# Patient Record
Sex: MALE | Race: OTHER | Employment: FULL TIME | URBAN - METROPOLITAN AREA
[De-identification: names, ages, dates, MRNs, and addresses within clinical notes are randomized per-mention and may not be internally consistent; named-entity substitution may affect disease eponyms.]

---

## 2017-04-08 ENCOUNTER — LAB ENCOUNTER (OUTPATIENT)
Dept: LAB | Age: 37
End: 2017-04-08
Attending: FAMILY MEDICINE
Payer: COMMERCIAL

## 2017-04-08 ENCOUNTER — OFFICE VISIT (OUTPATIENT)
Dept: FAMILY MEDICINE CLINIC | Facility: CLINIC | Age: 37
End: 2017-04-08

## 2017-04-08 VITALS
BODY MASS INDEX: 34.72 KG/M2 | DIASTOLIC BLOOD PRESSURE: 80 MMHG | TEMPERATURE: 99 F | WEIGHT: 248 LBS | HEART RATE: 91 BPM | SYSTOLIC BLOOD PRESSURE: 124 MMHG | HEIGHT: 71 IN

## 2017-04-08 DIAGNOSIS — E11.65 UNCONTROLLED TYPE 2 DIABETES MELLITUS WITH HYPERGLYCEMIA, WITHOUT LONG-TERM CURRENT USE OF INSULIN (HCC): ICD-10-CM

## 2017-04-08 DIAGNOSIS — Z00.00 ADULT GENERAL MEDICAL EXAM: Primary | ICD-10-CM

## 2017-04-08 DIAGNOSIS — Z00.00 ADULT GENERAL MEDICAL EXAM: ICD-10-CM

## 2017-04-08 PROCEDURE — 99385 PREV VISIT NEW AGE 18-39: CPT | Performed by: FAMILY MEDICINE

## 2017-04-08 PROCEDURE — 85025 COMPLETE CBC W/AUTO DIFF WBC: CPT

## 2017-04-08 PROCEDURE — 82570 ASSAY OF URINE CREATININE: CPT

## 2017-04-08 PROCEDURE — 82043 UR ALBUMIN QUANTITATIVE: CPT

## 2017-04-08 PROCEDURE — 80061 LIPID PANEL: CPT

## 2017-04-08 PROCEDURE — 80053 COMPREHEN METABOLIC PANEL: CPT

## 2017-04-08 PROCEDURE — 84443 ASSAY THYROID STIM HORMONE: CPT

## 2017-04-08 PROCEDURE — 83036 HEMOGLOBIN GLYCOSYLATED A1C: CPT

## 2017-04-08 PROCEDURE — 36415 COLL VENOUS BLD VENIPUNCTURE: CPT

## 2017-04-08 RX ORDER — PIOGLITAZONEHYDROCHLORIDE 30 MG/1
30 TABLET ORAL DAILY
COMMUNITY
End: 2017-04-09

## 2017-04-08 RX ORDER — CETIRIZINE HYDROCHLORIDE 10 MG/1
10 TABLET ORAL DAILY
COMMUNITY
End: 2018-05-17

## 2017-04-08 NOTE — PROGRESS NOTES
Patient ID: Marycruz Serrano is a 31year old male. HPI  Patient presents with:  Routine Physical      This is his first time here. He is . He does not smoke.   He works as a .  He states there is no family history of diabetes and hyperte Exam  Physical Exam   Constitutional: He appears well-developed and well-nourished. No distress. HENT:   Head: Normocephalic.    Right Ear: Tympanic membrane and ear canal normal.   Left Ear: Tympanic membrane and ear canal normal.   Mouth/Throat: Orophar symptoms persist.  Take medicine (if given) as prescribed. Approach to treatment discussed and patient/family member understands and agrees to plan. Tae Munguia DO  4/8/2017      .

## 2017-04-11 ENCOUNTER — TELEPHONE (OUTPATIENT)
Dept: FAMILY MEDICINE CLINIC | Facility: CLINIC | Age: 37
End: 2017-04-11

## 2017-04-11 NOTE — TELEPHONE ENCOUNTER
----- Message from Gus Sanford DO sent at 4/9/2017  4:57 PM CDT -----  Your diabetes is terribly uncontrolled. Stay on the pioglitazone but I will increase this to 45 mg daily and we will start her on metformin twice daily.   You need to be on a cholest

## 2017-04-11 NOTE — TELEPHONE ENCOUNTER
Spoke with patient (identified name and ), results reviewed and agrees with plan.  Appointment made 17 4:40 pm

## 2017-05-09 ENCOUNTER — OFFICE VISIT (OUTPATIENT)
Dept: FAMILY MEDICINE CLINIC | Facility: CLINIC | Age: 37
End: 2017-05-09

## 2017-05-09 VITALS
SYSTOLIC BLOOD PRESSURE: 115 MMHG | DIASTOLIC BLOOD PRESSURE: 74 MMHG | HEART RATE: 75 BPM | BODY MASS INDEX: 36.26 KG/M2 | WEIGHT: 259 LBS | HEIGHT: 71 IN | TEMPERATURE: 98 F

## 2017-05-09 DIAGNOSIS — L30.9 DERMATITIS: ICD-10-CM

## 2017-05-09 DIAGNOSIS — Z23 NEED FOR VACCINATION: ICD-10-CM

## 2017-05-09 DIAGNOSIS — E11.65 UNCONTROLLED TYPE 2 DIABETES MELLITUS WITH HYPERGLYCEMIA, WITHOUT LONG-TERM CURRENT USE OF INSULIN (HCC): Primary | ICD-10-CM

## 2017-05-09 DIAGNOSIS — L73.0 ACNE KELOIDALIS NUCHAE: ICD-10-CM

## 2017-05-09 PROCEDURE — 90715 TDAP VACCINE 7 YRS/> IM: CPT | Performed by: FAMILY MEDICINE

## 2017-05-09 PROCEDURE — 99212 OFFICE O/P EST SF 10 MIN: CPT | Performed by: FAMILY MEDICINE

## 2017-05-09 PROCEDURE — 90471 IMMUNIZATION ADMIN: CPT | Performed by: FAMILY MEDICINE

## 2017-05-09 PROCEDURE — 99214 OFFICE O/P EST MOD 30 MIN: CPT | Performed by: FAMILY MEDICINE

## 2017-05-09 NOTE — PROGRESS NOTES
Patient ID: Marycruz Serrano is a 31year old male. HPI  Patient presents with:  Lab Results    He does have diabetes. He did start the medicines. He is tolerating everything fine. He would like to see a diabetic educator.   He does not remember his las Date   A1C 10.4* 04/08/2017         Lab Results  Component Value Date   MALBP 6.2* 04/08/2017   CREUR 258.1 04/08/2017         Lab Results  Component Value Date   CHOLEST 155 04/08/2017   TRIG 172* 04/08/2017   HDL 35 04/08/2017   LDL 86 04/08/2017   NONHD place, and time. Patient appears well-developed and well-nourished. No distress. HENT:   Head: Normocephalic and atraumatic. Neck: Normal range of motion. Neck supple. No thyromegaly present. Lymphadenopathy:     Has  no cervical adenopathy.    Cardio

## 2017-05-24 ENCOUNTER — OFFICE VISIT (OUTPATIENT)
Dept: PODIATRY CLINIC | Facility: CLINIC | Age: 37
End: 2017-05-24

## 2017-05-24 DIAGNOSIS — E11.9 TYPE 2 DIABETES MELLITUS WITHOUT COMPLICATION, WITHOUT LONG-TERM CURRENT USE OF INSULIN (HCC): Primary | ICD-10-CM

## 2017-05-24 PROCEDURE — 99243 OFF/OP CNSLTJ NEW/EST LOW 30: CPT | Performed by: PODIATRIST

## 2017-05-24 PROCEDURE — 99212 OFFICE O/P EST SF 10 MIN: CPT | Performed by: PODIATRIST

## 2017-05-24 NOTE — PROGRESS NOTES
HPI:    Patient ID: Marycruz Serrano is a 31year old male. HPI  This 61-year-old male presents as a new patient to me on consult from [address]. Patient states that he is here for a diabetic foot evaluation.   He was recently diagnosed with diabetes and i 1 year but he was encouraged to call immediately with any foot related concerns.   Patient indicates an understanding of my instructions         ASSESSMENT/PLAN:   Type 2 diabetes mellitus without complication, without long-term current use of insulin (hcc)

## 2017-06-03 ENCOUNTER — OFFICE VISIT (OUTPATIENT)
Dept: DERMATOLOGY CLINIC | Facility: CLINIC | Age: 37
End: 2017-06-03

## 2017-06-03 ENCOUNTER — APPOINTMENT (OUTPATIENT)
Dept: LAB | Age: 37
End: 2017-06-03
Attending: FAMILY MEDICINE
Payer: COMMERCIAL

## 2017-06-03 DIAGNOSIS — L30.9 DERMATITIS: Primary | ICD-10-CM

## 2017-06-03 DIAGNOSIS — E11.65 UNCONTROLLED TYPE 2 DIABETES MELLITUS WITH HYPERGLYCEMIA, WITHOUT LONG-TERM CURRENT USE OF INSULIN (HCC): ICD-10-CM

## 2017-06-03 PROCEDURE — 36415 COLL VENOUS BLD VENIPUNCTURE: CPT

## 2017-06-03 PROCEDURE — 84460 ALANINE AMINO (ALT) (SGPT): CPT

## 2017-06-03 PROCEDURE — 99202 OFFICE O/P NEW SF 15 MIN: CPT | Performed by: DERMATOLOGY

## 2017-06-03 PROCEDURE — 80048 BASIC METABOLIC PNL TOTAL CA: CPT

## 2017-06-03 PROCEDURE — 99212 OFFICE O/P EST SF 10 MIN: CPT | Performed by: DERMATOLOGY

## 2017-06-03 PROCEDURE — 83036 HEMOGLOBIN GLYCOSYLATED A1C: CPT

## 2017-06-03 PROCEDURE — 80061 LIPID PANEL: CPT

## 2017-06-03 PROCEDURE — 84450 TRANSFERASE (AST) (SGOT): CPT

## 2017-06-03 RX ORDER — MONTELUKAST SODIUM 10 MG/1
10 TABLET ORAL NIGHTLY
Qty: 30 TABLET | Refills: 11 | Status: SHIPPED | OUTPATIENT
Start: 2017-06-03 | End: 2018-05-17

## 2017-06-17 NOTE — PROGRESS NOTES
Quick Note:    Letter mailed to pt with MD recommendation.     8/24/2017 4:15 PM Mitch Bradshaw MD [address] [facility]    ______

## 2017-06-19 NOTE — PROGRESS NOTES
Ally Poole is a 31year old male. Patient presents with:  Rash: New patient with rash to gonzález forearms, abd,  back x 4 months. Waxes and wanes. Pruritic at times. Tried rx cream without improvement. Unknown name of med.              Review of patient 2.5 MG Oral Tab Take 1 tablet (2.5 mg total) by mouth daily. For kidney protection and/or blood pressure Disp: 30 tablet Rfl: 3   cetirizine 10 MG Oral Tab Take 10 mg by mouth daily.  Disp:  Rfl:      Allergies:   No Known Allergies    Past Medical History including conjunctival mucosa, eyelids, oral mucosa, external ears, back, chest, abdomen, bilateral arms, bilateral legs, palms.         Remarkable for multiple erythematous scaling eczematous patches over arms abd, back    Exam otherwise significant for  3

## 2017-09-20 RX ORDER — PIOGLITAZONEHYDROCHLORIDE 45 MG/1
TABLET ORAL
Qty: 90 TABLET | Refills: 0 | Status: SHIPPED | OUTPATIENT
Start: 2017-09-20 | End: 2017-11-09

## 2017-09-20 RX ORDER — LISINOPRIL 2.5 MG/1
TABLET ORAL
Qty: 90 TABLET | Refills: 0 | Status: SHIPPED | OUTPATIENT
Start: 2017-09-20 | End: 2017-11-09

## 2017-09-20 NOTE — TELEPHONE ENCOUNTER
Please advise regarding pended refill requests as unable to refill per protocol d/t last A1c.     Lisinopril refilled per protocol     Diabetes Medications  Protocol Criteria:  · Appointment scheduled in the past 6 months or the next 3 months  · A1C < 7.5 i without long-term current use of insulin [facility])    [facility], [facility], [address], [address], [place], DO    Office Visit    5 months ago Adult general medical exam    [address], [place], [address] [place] [address]    Office Visit        Future

## 2017-09-21 NOTE — TELEPHONE ENCOUNTER
GUILLERMO- Please call and schedule an appointment for patient with Dr. Fredy Osuna. See message below.

## 2017-10-05 ENCOUNTER — OFFICE VISIT (OUTPATIENT)
Dept: FAMILY MEDICINE CLINIC | Facility: CLINIC | Age: 37
End: 2017-10-05

## 2017-10-05 VITALS
SYSTOLIC BLOOD PRESSURE: 123 MMHG | WEIGHT: 269.19 LBS | TEMPERATURE: 99 F | BODY MASS INDEX: 37.69 KG/M2 | HEIGHT: 71 IN | HEART RATE: 87 BPM | RESPIRATION RATE: 14 BRPM | DIASTOLIC BLOOD PRESSURE: 81 MMHG

## 2017-10-05 DIAGNOSIS — E55.9 VITAMIN D DEFICIENCY: ICD-10-CM

## 2017-10-05 DIAGNOSIS — D69.6 THROMBOCYTOPENIA (HCC): ICD-10-CM

## 2017-10-05 DIAGNOSIS — E11.65 UNCONTROLLED TYPE 2 DIABETES MELLITUS WITH HYPERGLYCEMIA, WITHOUT LONG-TERM CURRENT USE OF INSULIN (HCC): Primary | ICD-10-CM

## 2017-10-05 DIAGNOSIS — Z23 NEED FOR VACCINATION: ICD-10-CM

## 2017-10-05 PROCEDURE — 99212 OFFICE O/P EST SF 10 MIN: CPT | Performed by: FAMILY MEDICINE

## 2017-10-05 PROCEDURE — 90732 PPSV23 VACC 2 YRS+ SUBQ/IM: CPT | Performed by: FAMILY MEDICINE

## 2017-10-05 PROCEDURE — 90472 IMMUNIZATION ADMIN EACH ADD: CPT | Performed by: FAMILY MEDICINE

## 2017-10-05 PROCEDURE — 99214 OFFICE O/P EST MOD 30 MIN: CPT | Performed by: FAMILY MEDICINE

## 2017-10-05 PROCEDURE — 90471 IMMUNIZATION ADMIN: CPT | Performed by: FAMILY MEDICINE

## 2017-10-05 PROCEDURE — 90686 IIV4 VACC NO PRSV 0.5 ML IM: CPT | Performed by: FAMILY MEDICINE

## 2017-10-05 NOTE — PROGRESS NOTES
Patient ID: Dontrell Loomis is a 30year old male. HPI  Patient presents with:  Diabetes    He states he feels very good. He did see the podiatrist already. He has an appointment with the ophthalmologist in the near future.     He works as a  a [employer], [employer], [employer], [employer], [employer], [place], [place], [employer]    Lab Results  Component Value Date   A1C 7.6 (H) 06/03/2017   A1C 10.4 (H) 04/08/2017         Lab Results  Component Value Date   MALBP 6.2 (H) 04/08/2017   CREUR 2 temperature 98.9 °F (37.2 °C), temperature source Oral, resp. rate 14, height 5' 11\" (1.803 m), weight 269 lb 3.2 oz (122.1 kg). Physical Exam   Constitutional: Patient is oriented to person, place, and time.  Patient appears well-developed and well-nida Gene Client, DO  10/5/2017

## 2017-10-28 ENCOUNTER — LAB ENCOUNTER (OUTPATIENT)
Dept: LAB | Age: 37
End: 2017-10-28
Attending: FAMILY MEDICINE
Payer: COMMERCIAL

## 2017-10-28 DIAGNOSIS — E55.9 VITAMIN D DEFICIENCY: ICD-10-CM

## 2017-10-28 DIAGNOSIS — D69.6 THROMBOCYTOPENIA (HCC): ICD-10-CM

## 2017-10-28 DIAGNOSIS — E11.65 UNCONTROLLED TYPE 2 DIABETES MELLITUS WITH HYPERGLYCEMIA, WITHOUT LONG-TERM CURRENT USE OF INSULIN (HCC): ICD-10-CM

## 2017-10-28 PROCEDURE — 80048 BASIC METABOLIC PNL TOTAL CA: CPT

## 2017-10-28 PROCEDURE — 83036 HEMOGLOBIN GLYCOSYLATED A1C: CPT

## 2017-10-28 PROCEDURE — 85025 COMPLETE CBC W/AUTO DIFF WBC: CPT

## 2017-10-28 PROCEDURE — 82306 VITAMIN D 25 HYDROXY: CPT

## 2017-10-28 PROCEDURE — 36415 COLL VENOUS BLD VENIPUNCTURE: CPT

## 2017-11-10 RX ORDER — LISINOPRIL 2.5 MG/1
TABLET ORAL
Qty: 90 TABLET | Refills: 0 | Status: SHIPPED | OUTPATIENT
Start: 2017-11-10 | End: 2018-02-24

## 2017-11-10 RX ORDER — PIOGLITAZONEHYDROCHLORIDE 45 MG/1
TABLET ORAL
Qty: 90 TABLET | Refills: 0 | Status: SHIPPED | OUTPATIENT
Start: 2017-11-10 | End: 2018-02-24

## 2017-11-10 NOTE — TELEPHONE ENCOUNTER
Refilled per protocol      Diabetes Medications  Protocol Criteria:  · Appointment scheduled in the past 6 months or the next 3 months  · A1C < 7.5 in the past 6 months  · Creatinine in the past 12 months  · Creatinine result < 1.5   Recent Outpatient Visi 5 months ago Type 2 diabetes mellitus without complication, without long-term current use of insulin [facility])    [facility], [address] [place], [place]    Office Visit    6 months ago Uncontrolled type 2 diabetes

## 2017-12-21 ENCOUNTER — TELEPHONE (OUTPATIENT)
Dept: OPHTHALMOLOGY | Facility: CLINIC | Age: 37
End: 2017-12-21

## 2017-12-21 NOTE — TELEPHONE ENCOUNTER
Patient did not show up for appointment. Left message for patient to call back and reschedule. Missed appointment letter sent out.

## 2018-01-30 ENCOUNTER — TELEPHONE (OUTPATIENT)
Dept: FAMILY MEDICINE CLINIC | Facility: CLINIC | Age: 38
End: 2018-01-30

## 2018-01-30 DIAGNOSIS — E11.9 CONTROLLED TYPE 2 DIABETES MELLITUS WITHOUT COMPLICATION, WITHOUT LONG-TERM CURRENT USE OF INSULIN (HCC): Primary | ICD-10-CM

## 2018-02-03 ENCOUNTER — APPOINTMENT (OUTPATIENT)
Dept: LAB | Age: 38
End: 2018-02-03
Attending: FAMILY MEDICINE
Payer: COMMERCIAL

## 2018-02-03 DIAGNOSIS — E11.9 CONTROLLED TYPE 2 DIABETES MELLITUS WITHOUT COMPLICATION, WITHOUT LONG-TERM CURRENT USE OF INSULIN (HCC): ICD-10-CM

## 2018-02-03 LAB
ALT SERPL-CCNC: 37 U/L (ref 17–63)
ANION GAP SERPL CALC-SCNC: 6 MMOL/L (ref 0–18)
AST SERPL-CCNC: 33 U/L (ref 15–41)
BUN SERPL-MCNC: 10 MG/DL (ref 8–20)
BUN/CREAT SERPL: 15.4 (ref 10–20)
CALCIUM SERPL-MCNC: 9.2 MG/DL (ref 8.5–10.5)
CHLORIDE SERPL-SCNC: 105 MMOL/L (ref 95–110)
CHOLEST SERPL-MCNC: 134 MG/DL (ref 110–200)
CO2 SERPL-SCNC: 26 MMOL/L (ref 22–32)
CREAT SERPL-MCNC: 0.65 MG/DL (ref 0.5–1.5)
CREAT UR-MCNC: 132.9 MG/DL
GLUCOSE SERPL-MCNC: 100 MG/DL (ref 70–99)
HDLC SERPL-MCNC: 38 MG/DL
LDLC SERPL CALC-MCNC: 80 MG/DL (ref 0–99)
MICROALBUMIN UR-MCNC: 0.8 MG/DL (ref 0–1.8)
MICROALBUMIN/CREAT UR: 6 MG/G{CREAT} (ref 0–20)
NONHDLC SERPL-MCNC: 96 MG/DL
OSMOLALITY UR CALC.SUM OF ELEC: 283 MOSM/KG (ref 275–295)
POTASSIUM SERPL-SCNC: 3.9 MMOL/L (ref 3.3–5.1)
SODIUM SERPL-SCNC: 137 MMOL/L (ref 136–144)
TRIGL SERPL-MCNC: 78 MG/DL (ref 1–149)

## 2018-02-03 PROCEDURE — 80061 LIPID PANEL: CPT

## 2018-02-03 PROCEDURE — 84450 TRANSFERASE (AST) (SGOT): CPT

## 2018-02-03 PROCEDURE — 82570 ASSAY OF URINE CREATININE: CPT

## 2018-02-03 PROCEDURE — 82043 UR ALBUMIN QUANTITATIVE: CPT

## 2018-02-03 PROCEDURE — 36415 COLL VENOUS BLD VENIPUNCTURE: CPT

## 2018-02-03 PROCEDURE — 80048 BASIC METABOLIC PNL TOTAL CA: CPT

## 2018-02-03 PROCEDURE — 84460 ALANINE AMINO (ALT) (SGPT): CPT

## 2018-02-03 PROCEDURE — 83036 HEMOGLOBIN GLYCOSYLATED A1C: CPT

## 2018-02-04 LAB — HBA1C MFR BLD: 7.5 % (ref 4–6)

## 2018-02-22 ENCOUNTER — TELEPHONE (OUTPATIENT)
Dept: OTHER | Age: 38
End: 2018-02-22

## 2018-02-22 NOTE — TELEPHONE ENCOUNTER
Patient was left a detail message on personal voicemail.   Keep appt 2/24/18 with DR CROOKS.     If has any questions, patient to call back can transfer to [phone]    Notes Recorded by Mitch Knight DO on 2/20/2018 at 7:07 PM CST  Please have him see me as he w

## 2018-02-24 ENCOUNTER — APPOINTMENT (OUTPATIENT)
Dept: LAB | Age: 38
End: 2018-02-24
Attending: FAMILY MEDICINE
Payer: COMMERCIAL

## 2018-02-24 ENCOUNTER — OFFICE VISIT (OUTPATIENT)
Dept: FAMILY MEDICINE CLINIC | Facility: CLINIC | Age: 38
End: 2018-02-24

## 2018-02-24 VITALS
TEMPERATURE: 98 F | WEIGHT: 275 LBS | HEART RATE: 82 BPM | HEIGHT: 71 IN | DIASTOLIC BLOOD PRESSURE: 69 MMHG | BODY MASS INDEX: 38.5 KG/M2 | SYSTOLIC BLOOD PRESSURE: 117 MMHG

## 2018-02-24 DIAGNOSIS — E55.9 VITAMIN D DEFICIENCY: ICD-10-CM

## 2018-02-24 DIAGNOSIS — D69.6 THROMBOCYTOPENIA (HCC): ICD-10-CM

## 2018-02-24 DIAGNOSIS — E78.2 MIXED HYPERLIPIDEMIA: ICD-10-CM

## 2018-02-24 DIAGNOSIS — E11.65 UNCONTROLLED TYPE 2 DIABETES MELLITUS WITH HYPERGLYCEMIA, WITHOUT LONG-TERM CURRENT USE OF INSULIN (HCC): ICD-10-CM

## 2018-02-24 DIAGNOSIS — E11.65 UNCONTROLLED TYPE 2 DIABETES MELLITUS WITH HYPERGLYCEMIA, WITHOUT LONG-TERM CURRENT USE OF INSULIN (HCC): Primary | ICD-10-CM

## 2018-02-24 PROCEDURE — 99214 OFFICE O/P EST MOD 30 MIN: CPT | Performed by: FAMILY MEDICINE

## 2018-02-24 PROCEDURE — 99212 OFFICE O/P EST SF 10 MIN: CPT | Performed by: FAMILY MEDICINE

## 2018-02-24 PROCEDURE — 93005 ELECTROCARDIOGRAM TRACING: CPT

## 2018-02-24 PROCEDURE — 93010 ELECTROCARDIOGRAM REPORT: CPT | Performed by: FAMILY MEDICINE

## 2018-02-24 RX ORDER — ATORVASTATIN CALCIUM 20 MG/1
20 TABLET, FILM COATED ORAL NIGHTLY
Qty: 90 TABLET | Refills: 1 | Status: SHIPPED | OUTPATIENT
Start: 2018-02-24 | End: 2018-05-17

## 2018-02-24 RX ORDER — LISINOPRIL 2.5 MG/1
TABLET ORAL
Qty: 90 TABLET | Refills: 0 | Status: SHIPPED | OUTPATIENT
Start: 2018-02-24 | End: 2018-05-17

## 2018-02-24 RX ORDER — PIOGLITAZONEHYDROCHLORIDE 45 MG/1
TABLET ORAL
Qty: 90 TABLET | Refills: 1 | Status: SHIPPED | OUTPATIENT
Start: 2018-02-24 | End: 2018-05-17

## 2018-02-24 NOTE — PROGRESS NOTES
Patient ID: Jaye Ho is a 30year old male.     HPI  Patient presents with:  Results: result and meds per pt., pt would like rx for 90 a day   Medication Problem: stomach pain from meds per pt    Patient was left a detail message on personal voicemail 02/03/2018   GFRNAA >60 02/03/2018   GFRAA >60 02/03/2018   CA 9.2 02/03/2018   OSMOCALC 283 02/03/2018   ALKPHO 93 04/08/2017   AST 33 02/03/2018   ALT 37 02/03/2018   BILT 0.8 04/08/2017   TP 7.8 04/08/2017   ALB 4.2 04/08/2017   GLOBULIN 3.6 04/08/2017 123/81  05/09/17 : 115/74  04/08/17 : 124/80        Review of Systems      Past Medical History:   Diagnosis Date   • Borderline diabetes    • Diabetes ([facility] [address]        History reviewed. No pertinent surgical history.        Current Outpatient Prescriptions:  M insulin (HCC)  -     MetFORMIN HCl 1000 MG Oral Tab; TAKE ONE TABLET BY MOUTH TWICE DAILY WITH MEALS FOR DIABETES  -     Pioglitazone HCl 45 MG Oral Tab; TAKE ONE TABLET BY MOUTH ONCE DAILY for diabetes  -     lisinopril 2.5 MG Oral Tab; TAKE ONE TABLET BY

## 2018-04-28 ENCOUNTER — APPOINTMENT (OUTPATIENT)
Dept: LAB | Age: 38
End: 2018-04-28
Attending: FAMILY MEDICINE
Payer: COMMERCIAL

## 2018-04-28 DIAGNOSIS — D69.6 THROMBOCYTOPENIA (HCC): ICD-10-CM

## 2018-04-28 DIAGNOSIS — E78.2 MIXED HYPERLIPIDEMIA: ICD-10-CM

## 2018-04-28 DIAGNOSIS — E55.9 VITAMIN D DEFICIENCY: ICD-10-CM

## 2018-04-28 DIAGNOSIS — E11.65 UNCONTROLLED TYPE 2 DIABETES MELLITUS WITH HYPERGLYCEMIA, WITHOUT LONG-TERM CURRENT USE OF INSULIN (HCC): ICD-10-CM

## 2018-04-28 PROCEDURE — 82306 VITAMIN D 25 HYDROXY: CPT

## 2018-04-28 PROCEDURE — 36415 COLL VENOUS BLD VENIPUNCTURE: CPT

## 2018-04-28 PROCEDURE — 80061 LIPID PANEL: CPT

## 2018-04-28 PROCEDURE — 80053 COMPREHEN METABOLIC PANEL: CPT

## 2018-04-28 PROCEDURE — 83036 HEMOGLOBIN GLYCOSYLATED A1C: CPT

## 2018-05-17 ENCOUNTER — OFFICE VISIT (OUTPATIENT)
Dept: FAMILY MEDICINE CLINIC | Facility: CLINIC | Age: 38
End: 2018-05-17

## 2018-05-17 ENCOUNTER — APPOINTMENT (OUTPATIENT)
Dept: LAB | Age: 38
End: 2018-05-17
Attending: FAMILY MEDICINE
Payer: COMMERCIAL

## 2018-05-17 VITALS
TEMPERATURE: 98 F | BODY MASS INDEX: 38 KG/M2 | WEIGHT: 275.81 LBS | SYSTOLIC BLOOD PRESSURE: 117 MMHG | DIASTOLIC BLOOD PRESSURE: 77 MMHG | HEART RATE: 90 BPM

## 2018-05-17 DIAGNOSIS — R14.0 ABDOMINAL BLOATING: ICD-10-CM

## 2018-05-17 DIAGNOSIS — R14.0 ABDOMINAL BLOATING: Primary | ICD-10-CM

## 2018-05-17 DIAGNOSIS — E78.2 MIXED HYPERLIPIDEMIA: ICD-10-CM

## 2018-05-17 DIAGNOSIS — R10.33 ABDOMINAL PAIN, PERIUMBILICAL: ICD-10-CM

## 2018-05-17 DIAGNOSIS — E11.65 UNCONTROLLED TYPE 2 DIABETES MELLITUS WITH HYPERGLYCEMIA, WITHOUT LONG-TERM CURRENT USE OF INSULIN (HCC): ICD-10-CM

## 2018-05-17 DIAGNOSIS — D69.6 THROMBOCYTOPENIA (HCC): ICD-10-CM

## 2018-05-17 PROCEDURE — 99212 OFFICE O/P EST SF 10 MIN: CPT | Performed by: FAMILY MEDICINE

## 2018-05-17 PROCEDURE — 99214 OFFICE O/P EST MOD 30 MIN: CPT | Performed by: FAMILY MEDICINE

## 2018-05-17 PROCEDURE — 83013 H PYLORI (C-13) BREATH: CPT

## 2018-05-17 RX ORDER — LISINOPRIL 2.5 MG/1
TABLET ORAL
Qty: 90 TABLET | Refills: 1 | Status: SHIPPED | OUTPATIENT
Start: 2018-05-17 | End: 2018-11-10

## 2018-05-17 RX ORDER — ATORVASTATIN CALCIUM 20 MG/1
20 TABLET, FILM COATED ORAL NIGHTLY
Qty: 90 TABLET | Refills: 1 | Status: SHIPPED | OUTPATIENT
Start: 2018-05-17 | End: 2018-09-14

## 2018-05-17 RX ORDER — PIOGLITAZONEHYDROCHLORIDE 45 MG/1
TABLET ORAL
Qty: 90 TABLET | Refills: 1 | Status: SHIPPED | OUTPATIENT
Start: 2018-05-17 | End: 2018-11-10

## 2018-05-17 NOTE — PROGRESS NOTES
Patient ID: Monse Cloud is a 30year old male. HPI  Patient presents with:  Lab Results    I saw him in February 2018. He states when he takes Lipitor at nighttime he will wake up with a sore stomach periumbilically.   It does not happen all the time BUNCREA 16.7 04/28/2018   ANIONGAP 9 04/28/2018   GFRAA >60 04/28/2018   GFRNAA >60 04/28/2018   CA 9.5 04/28/2018    04/28/2018   K 3.9 04/28/2018    04/28/2018   CO2 24 04/28/2018   OSMOCALC 284 04/28/2018       No results found for: Ernestine Ratel Borderline diabetes    • Diabetes ([facility] [address]        History reviewed. No pertinent surgical history. Current Outpatient Prescriptions:  atorvastatin (LIPITOR) 20 MG Oral Tab Take 1 tablet (20 mg total) by mouth nightly. For cholesterol.  Disp: 90 tablet Rf this visit:    Abdominal bloating  -     HELICOBACTER PYLORI BREATH TEST, ADULT (>17); Future  I will do an H. pylori test today. Await treatment until he get this test back.   Abdominal pain, periumbilical  -     HELICOBACTER PYLORI BREATH TEST, ADULT (>1

## 2018-10-23 ENCOUNTER — APPOINTMENT (OUTPATIENT)
Dept: GENERAL RADIOLOGY | Facility: HOSPITAL | Age: 38
End: 2018-10-23
Attending: EMERGENCY MEDICINE
Payer: COMMERCIAL

## 2018-10-23 ENCOUNTER — HOSPITAL ENCOUNTER (EMERGENCY)
Facility: HOSPITAL | Age: 38
Discharge: HOME | End: 2018-10-23
Attending: EMERGENCY MEDICINE
Payer: COMMERCIAL

## 2018-10-23 VITALS
BODY MASS INDEX: 40.6 KG/M2 | OXYGEN SATURATION: 96 % | HEART RATE: 74 BPM | TEMPERATURE: 98 F | SYSTOLIC BLOOD PRESSURE: 111 MMHG | WEIGHT: 290 LBS | RESPIRATION RATE: 17 BRPM | DIASTOLIC BLOOD PRESSURE: 63 MMHG | HEIGHT: 71 IN

## 2018-10-23 DIAGNOSIS — R07.89 CHEST PAIN, ATYPICAL: Primary | ICD-10-CM

## 2018-10-23 PROCEDURE — 85025 COMPLETE CBC W/AUTO DIFF WBC: CPT | Performed by: EMERGENCY MEDICINE

## 2018-10-23 PROCEDURE — 93005 ELECTROCARDIOGRAM TRACING: CPT

## 2018-10-23 PROCEDURE — 84484 ASSAY OF TROPONIN QUANT: CPT | Performed by: EMERGENCY MEDICINE

## 2018-10-23 PROCEDURE — 93010 ELECTROCARDIOGRAM REPORT: CPT | Performed by: EMERGENCY MEDICINE

## 2018-10-23 PROCEDURE — 99285 EMERGENCY DEPT VISIT HI MDM: CPT

## 2018-10-23 PROCEDURE — 96374 THER/PROPH/DIAG INJ IV PUSH: CPT

## 2018-10-23 PROCEDURE — 80048 BASIC METABOLIC PNL TOTAL CA: CPT | Performed by: EMERGENCY MEDICINE

## 2018-10-23 PROCEDURE — 71046 X-RAY EXAM CHEST 2 VIEWS: CPT | Performed by: EMERGENCY MEDICINE

## 2018-10-23 RX ORDER — KETOROLAC TROMETHAMINE 30 MG/ML
30 INJECTION, SOLUTION INTRAMUSCULAR; INTRAVENOUS ONCE
Status: COMPLETED | OUTPATIENT
Start: 2018-10-23 | End: 2018-10-23

## 2018-10-24 NOTE — ED PROVIDER NOTES
Patient Seen in: [facility] AND [facility] Emergency Department    History   Patient presents with:  Chest Pain Angina (cardiovascular)    Stated Complaint:     HPI    51-year-old male who does not smoke or use drugs with history of diabetes who presents with so respiratory distress. Clear and equal BS b/l. Abdominal: Soft. There is no tenderness. There is no guarding. Musculoskeletal: Normal range of motion. No edema or tenderness. Neurological: Alert and oriented to person, place, and time.  No focal deficit adenopathy. LUNGS/PLEURA: Normal.  No significant pulmonary parenchymal abnormalities. No effusion or pleural thickening. BONES: No fracture or visible bony lesion. OTHER: Negative.        CONCLUSION:   Negative for radiographically evident acute intratho

## 2018-10-24 NOTE — ED INITIAL ASSESSMENT (HPI)
Pt presents for eval of left-sided chest tightness that began this morning and has worsened over the day.  Denies sob

## 2018-10-27 ENCOUNTER — LAB ENCOUNTER (OUTPATIENT)
Dept: LAB | Age: 38
End: 2018-10-27
Attending: FAMILY MEDICINE
Payer: COMMERCIAL

## 2018-10-27 DIAGNOSIS — D69.6 THROMBOCYTOPENIA (HCC): ICD-10-CM

## 2018-10-27 DIAGNOSIS — E11.65 UNCONTROLLED TYPE 2 DIABETES MELLITUS WITH HYPERGLYCEMIA, WITHOUT LONG-TERM CURRENT USE OF INSULIN (HCC): ICD-10-CM

## 2018-10-27 PROCEDURE — 80048 BASIC METABOLIC PNL TOTAL CA: CPT

## 2018-10-27 PROCEDURE — 83036 HEMOGLOBIN GLYCOSYLATED A1C: CPT

## 2018-10-27 PROCEDURE — 85025 COMPLETE CBC W/AUTO DIFF WBC: CPT

## 2018-10-27 PROCEDURE — 36415 COLL VENOUS BLD VENIPUNCTURE: CPT

## 2018-11-10 ENCOUNTER — OFFICE VISIT (OUTPATIENT)
Dept: FAMILY MEDICINE CLINIC | Facility: CLINIC | Age: 38
End: 2018-11-10
Payer: COMMERCIAL

## 2018-11-10 VITALS
DIASTOLIC BLOOD PRESSURE: 79 MMHG | SYSTOLIC BLOOD PRESSURE: 117 MMHG | WEIGHT: 278 LBS | HEIGHT: 71 IN | TEMPERATURE: 98 F | RESPIRATION RATE: 16 BRPM | BODY MASS INDEX: 38.92 KG/M2 | HEART RATE: 78 BPM

## 2018-11-10 DIAGNOSIS — E78.2 MIXED HYPERLIPIDEMIA: ICD-10-CM

## 2018-11-10 DIAGNOSIS — R07.89 ATYPICAL CHEST PAIN: ICD-10-CM

## 2018-11-10 DIAGNOSIS — F41.9 ANXIETY: ICD-10-CM

## 2018-11-10 DIAGNOSIS — E11.65 UNCONTROLLED TYPE 2 DIABETES MELLITUS WITH HYPERGLYCEMIA, WITHOUT LONG-TERM CURRENT USE OF INSULIN (HCC): Primary | ICD-10-CM

## 2018-11-10 DIAGNOSIS — Z23 NEED FOR VACCINATION: ICD-10-CM

## 2018-11-10 PROCEDURE — 99212 OFFICE O/P EST SF 10 MIN: CPT | Performed by: FAMILY MEDICINE

## 2018-11-10 PROCEDURE — 99215 OFFICE O/P EST HI 40 MIN: CPT | Performed by: FAMILY MEDICINE

## 2018-11-10 RX ORDER — ESCITALOPRAM OXALATE 10 MG/1
10 TABLET ORAL DAILY
Qty: 90 TABLET | Refills: 0 | Status: SHIPPED | OUTPATIENT
Start: 2018-11-10 | End: 2018-12-24

## 2018-11-10 RX ORDER — ATORVASTATIN CALCIUM 20 MG/1
20 TABLET, FILM COATED ORAL NIGHTLY
Qty: 90 TABLET | Refills: 1 | Status: SHIPPED | OUTPATIENT
Start: 2018-11-10 | End: 2019-06-15

## 2018-11-10 RX ORDER — PIOGLITAZONEHYDROCHLORIDE 45 MG/1
TABLET ORAL
Qty: 90 TABLET | Refills: 1 | Status: SHIPPED | OUTPATIENT
Start: 2018-11-10 | End: 2019-06-15

## 2018-11-10 RX ORDER — LISINOPRIL 2.5 MG/1
TABLET ORAL
Qty: 90 TABLET | Refills: 1 | Status: SHIPPED | OUTPATIENT
Start: 2018-11-10 | End: 2019-06-15

## 2018-11-10 NOTE — PATIENT INSTRUCTIONS
HEALTH TIPS     Exercise, work on your diet, watch your portion sizes. Avoid eating late at night. Make sure to EAT BREAKFAST as people who skip breakfast do not lose weight.   I myself have 3-4 \"Brown 'N Serve\" sausages every morning

## 2018-11-10 NOTE — PROGRESS NOTES
Patient ID: Thiago Gonzalez is a 41year old male. HPI  Patient presents with:  Test Results       His hemoglobin A1c is better at 7.2. He came in for exam today.   His kidney function was normal and his CBC was normal.     He went to the emergency room kg/m²  05/09/17 : 36.12 kg/m²      BP Readings from Last 6 Encounters:  11/10/18 : 117/79  10/23/18 : 111/63  05/17/18 : 117/77  02/24/18 : 117/69  10/05/17 : 123/81  05/09/17 : 115/74        Review of Systems      Past Medical History:   Diagnosis Date otherwise very pleasant. Nursing note and vitals reviewed.          ASSESSMENT/PLAN:     Diagnoses and all orders for this visit:    Uncontrolled type 2 diabetes mellitus with hyperglycemia, without long-term current use of insulin (HCC)  -     MetFORMIN H well.      Referrals (if applicable)  No orders of the defined types were placed in this encounter. Follow up if symptoms persist.  Take medicine (if given) as prescribed.   Approach to treatment discussed and patient/family member understands and ag

## 2018-11-18 NOTE — ED PROVIDER NOTES
Patient Seen in: [facility] AND [facility] Emergency Department    History   No chief complaint on file. Stated Complaint: Panic Attack      HPI    42 yo M with PMH DM, HTN, HL, anxiety presenting for evaluation of one day of anxiety.  Noting increasing work/fam dose of metformin 1000 mg. (For diabetes)   Cholecalciferol (VITAMIN D3) 2000 units Oral Tab,  Take 2,000 Units by mouth daily.        Family History   Problem Relation Age of Onset   • Cancer Mother        Social History    Tobacco Use      Smoking status from 10/23/18 as interpreted by myself           Xr Chest Pa + Lat Chest (cpt=71046)    Result Date: 10/23/2018  PROCEDURE: XR CHEST PA + LAT CHEST (CPT=71020)  COMPARISON: None. INDICATIONS: Left side chest tightness for one day. TECHNIQUE:   Two views. (25 mg total) by mouth every 4 (four) hours as needed for Itching or Anxiety (Use caution while taking this medication and operating macinery or driving - it may make you drowsy. )., Print Script, Disp-20 tablet, RKairos AR0

## 2018-11-18 NOTE — ED NOTES
Pt and pts family member provided with discharge instructions and prescriptions. Verbalized understanding for plan of care at home and follow up. All questions/concerns addressed prior to discharge.    Pt ambulated out of er with steady gait

## 2018-11-18 NOTE — ED NOTES
Pt c/o having high BP at home, and then starting to feel anxious. Pt states that he was here x1wk ago for anxiety and was prescribed a medicaiton, but that the medication is on backorder so he has not been able to take it yet.  Pt states that he has had a l

## 2018-12-24 DIAGNOSIS — F41.9 ANXIETY: ICD-10-CM

## 2018-12-24 RX ORDER — ESCITALOPRAM OXALATE 10 MG/1
TABLET ORAL
Qty: 90 TABLET | Refills: 0 | Status: SHIPPED | OUTPATIENT
Start: 2018-12-24 | End: 2019-08-10

## 2019-06-15 DIAGNOSIS — E11.65 UNCONTROLLED TYPE 2 DIABETES MELLITUS WITH HYPERGLYCEMIA, WITHOUT LONG-TERM CURRENT USE OF INSULIN (HCC): ICD-10-CM

## 2019-06-15 DIAGNOSIS — E78.2 MIXED HYPERLIPIDEMIA: ICD-10-CM

## 2019-06-17 RX ORDER — ATORVASTATIN CALCIUM 20 MG/1
TABLET, FILM COATED ORAL
Qty: 90 TABLET | Refills: 0 | Status: SHIPPED | OUTPATIENT
Start: 2019-06-17 | End: 2019-09-09

## 2019-06-17 RX ORDER — PIOGLITAZONEHYDROCHLORIDE 45 MG/1
TABLET ORAL
Qty: 90 TABLET | Refills: 0 | Status: SHIPPED | OUTPATIENT
Start: 2019-06-17 | End: 2019-09-09

## 2019-06-17 RX ORDER — LISINOPRIL 2.5 MG/1
TABLET ORAL
Qty: 90 TABLET | Refills: 0 | Status: SHIPPED | OUTPATIENT
Start: 2019-06-17 | End: 2019-09-09

## 2019-06-17 NOTE — TELEPHONE ENCOUNTER
Refilled times 1 but needs appointment before the next prescription will be filled. Please call patient and set up an office visit as overdue. Long overdue for diabetic visit. Please make him an appointment .

## 2019-08-10 ENCOUNTER — LAB ENCOUNTER (OUTPATIENT)
Dept: LAB | Age: 39
End: 2019-08-10
Attending: FAMILY MEDICINE
Payer: COMMERCIAL

## 2019-08-10 ENCOUNTER — OFFICE VISIT (OUTPATIENT)
Dept: FAMILY MEDICINE CLINIC | Facility: CLINIC | Age: 39
End: 2019-08-10
Payer: COMMERCIAL

## 2019-08-10 VITALS
HEART RATE: 85 BPM | TEMPERATURE: 99 F | WEIGHT: 267.63 LBS | DIASTOLIC BLOOD PRESSURE: 82 MMHG | BODY MASS INDEX: 37.47 KG/M2 | SYSTOLIC BLOOD PRESSURE: 112 MMHG | HEIGHT: 71 IN

## 2019-08-10 DIAGNOSIS — E11.65 UNCONTROLLED TYPE 2 DIABETES MELLITUS WITH HYPERGLYCEMIA, WITHOUT LONG-TERM CURRENT USE OF INSULIN (HCC): ICD-10-CM

## 2019-08-10 DIAGNOSIS — D69.6 THROMBOCYTOPENIA (HCC): ICD-10-CM

## 2019-08-10 DIAGNOSIS — E78.2 MIXED HYPERLIPIDEMIA: ICD-10-CM

## 2019-08-10 DIAGNOSIS — L50.3 DERMATOGRAPHISM: Primary | ICD-10-CM

## 2019-08-10 DIAGNOSIS — E55.9 VITAMIN D DEFICIENCY: ICD-10-CM

## 2019-08-10 LAB
ALBUMIN SERPL-MCNC: 4.1 G/DL (ref 3.4–5)
ALBUMIN/GLOB SERPL: 1.1 {RATIO} (ref 1–2)
ALP LIVER SERPL-CCNC: 98 U/L (ref 45–117)
ALT SERPL-CCNC: 34 U/L (ref 16–61)
ANION GAP SERPL CALC-SCNC: 5 MMOL/L (ref 0–18)
AST SERPL-CCNC: 24 U/L (ref 15–37)
BASOPHILS # BLD AUTO: 0.02 X10(3) UL (ref 0–0.2)
BASOPHILS NFR BLD AUTO: 0.3 %
BILIRUB SERPL-MCNC: 1 MG/DL (ref 0.1–2)
BUN BLD-MCNC: 11 MG/DL (ref 7–18)
BUN/CREAT SERPL: 13.1 (ref 10–20)
CALCIUM BLD-MCNC: 9.1 MG/DL (ref 8.5–10.1)
CHLORIDE SERPL-SCNC: 104 MMOL/L (ref 98–112)
CHOLEST SMN-MCNC: 99 MG/DL (ref ?–200)
CO2 SERPL-SCNC: 30 MMOL/L (ref 21–32)
CREAT BLD-MCNC: 0.84 MG/DL (ref 0.7–1.3)
CREAT UR-SCNC: 304 MG/DL
DEPRECATED RDW RBC AUTO: 41.5 FL (ref 35.1–46.3)
EOSINOPHIL # BLD AUTO: 0.18 X10(3) UL (ref 0–0.7)
EOSINOPHIL NFR BLD AUTO: 2.6 %
ERYTHROCYTE [DISTWIDTH] IN BLOOD BY AUTOMATED COUNT: 12.1 % (ref 11–15)
EST. AVERAGE GLUCOSE BLD GHB EST-MCNC: 146 MG/DL (ref 68–126)
GLOBULIN PLAS-MCNC: 3.9 G/DL (ref 2.8–4.4)
GLUCOSE BLD-MCNC: 122 MG/DL (ref 70–99)
HBA1C MFR BLD HPLC: 6.7 % (ref ?–5.7)
HCT VFR BLD AUTO: 47 % (ref 39–53)
HDLC SERPL-MCNC: 41 MG/DL (ref 40–59)
HGB BLD-MCNC: 16 G/DL (ref 13–17.5)
IMM GRANULOCYTES # BLD AUTO: 0.03 X10(3) UL (ref 0–1)
IMM GRANULOCYTES NFR BLD: 0.4 %
LDLC SERPL CALC-MCNC: 38 MG/DL (ref ?–100)
LYMPHOCYTES # BLD AUTO: 2.02 X10(3) UL (ref 1–4)
LYMPHOCYTES NFR BLD AUTO: 29.6 %
M PROTEIN MFR SERPL ELPH: 8 G/DL (ref 6.4–8.2)
MCH RBC QN AUTO: 32 PG (ref 26–34)
MCHC RBC AUTO-ENTMCNC: 34 G/DL (ref 31–37)
MCV RBC AUTO: 94 FL (ref 80–100)
MICROALBUMIN UR-MCNC: 21.7 MG/DL
MICROALBUMIN/CREAT 24H UR-RTO: 71.4 UG/MG (ref ?–30)
MONOCYTES # BLD AUTO: 0.55 X10(3) UL (ref 0.1–1)
MONOCYTES NFR BLD AUTO: 8.1 %
NEUTROPHILS # BLD AUTO: 4.03 X10 (3) UL (ref 1.5–7.7)
NEUTROPHILS # BLD AUTO: 4.03 X10(3) UL (ref 1.5–7.7)
NEUTROPHILS NFR BLD AUTO: 59 %
NONHDLC SERPL-MCNC: 58 MG/DL (ref ?–130)
OSMOLALITY SERPL CALC.SUM OF ELEC: 289 MOSM/KG (ref 275–295)
PATIENT FASTING: YES
PATIENT FASTING: YES
PLATELET # BLD AUTO: 168 10(3)UL (ref 150–450)
POTASSIUM SERPL-SCNC: 4.7 MMOL/L (ref 3.5–5.1)
RBC # BLD AUTO: 5 X10(6)UL (ref 4.3–5.7)
SODIUM SERPL-SCNC: 139 MMOL/L (ref 136–145)
TRIGL SERPL-MCNC: 101 MG/DL (ref 30–149)
TSI SER-ACNC: 1.12 MIU/ML (ref 0.36–3.74)
VLDLC SERPL CALC-MCNC: 20 MG/DL (ref 0–30)
WBC # BLD AUTO: 6.8 X10(3) UL (ref 4–11)

## 2019-08-10 PROCEDURE — 80061 LIPID PANEL: CPT

## 2019-08-10 PROCEDURE — 85025 COMPLETE CBC W/AUTO DIFF WBC: CPT

## 2019-08-10 PROCEDURE — 36415 COLL VENOUS BLD VENIPUNCTURE: CPT

## 2019-08-10 PROCEDURE — 83036 HEMOGLOBIN GLYCOSYLATED A1C: CPT

## 2019-08-10 PROCEDURE — 99214 OFFICE O/P EST MOD 30 MIN: CPT | Performed by: FAMILY MEDICINE

## 2019-08-10 PROCEDURE — 99212 OFFICE O/P EST SF 10 MIN: CPT | Performed by: FAMILY MEDICINE

## 2019-08-10 PROCEDURE — 84443 ASSAY THYROID STIM HORMONE: CPT

## 2019-08-10 PROCEDURE — 82570 ASSAY OF URINE CREATININE: CPT

## 2019-08-10 PROCEDURE — 82043 UR ALBUMIN QUANTITATIVE: CPT

## 2019-08-10 PROCEDURE — 80053 COMPREHEN METABOLIC PANEL: CPT

## 2019-08-10 NOTE — PROGRESS NOTES
Patient ID: Marycruz Serrano is a 42year old male. HPI  Patient presents with:    He is here for a rash. He has not been seen in some time for the diabetes but today would like his labs also ordered as he is fasting.     The patient works as a . 117/71  11/10/18 : 117/79  10/23/18 : 111/63  05/17/18 : 117/77  02/24/18 : 117/69        Review of Systems   Constitutional: Negative for chills and fever. HENT: Negative for voice change.     Respiratory: Negative for chest tightness and shortness of br membrane, external ear and ear canal normal.   Left Ear: Tympanic membrane, external ear and ear canal normal.   Nose: No mucosal edema or rhinorrhea. Oropharynx: clear and moist. mucous membranes are normal.    Neck: Normal range of motion. Neck supple. Gummy Multivitamins    Go ahead and take a multivitamin with vitamin D in it. I take a gummy multivitamin called Vitafusion Multivites which has all the daily recommended vitamins in it plus vitamin D.   You can get this from any

## 2019-08-10 NOTE — PATIENT INSTRUCTIONS
Gummy Multivitamins    Go ahead and take a multivitamin with vitamin D in it. I take a gummy multivitamin called Vitafusion Multivites which has all the daily recommended vitamins in it plus vitamin D.   You can

## 2019-09-09 ENCOUNTER — OFFICE VISIT (OUTPATIENT)
Dept: FAMILY MEDICINE CLINIC | Facility: CLINIC | Age: 39
End: 2019-09-09
Payer: COMMERCIAL

## 2019-09-09 VITALS
BODY MASS INDEX: 38.89 KG/M2 | HEIGHT: 71 IN | DIASTOLIC BLOOD PRESSURE: 83 MMHG | WEIGHT: 277.81 LBS | SYSTOLIC BLOOD PRESSURE: 118 MMHG | HEART RATE: 87 BPM | TEMPERATURE: 98 F

## 2019-09-09 DIAGNOSIS — E11.29 CONTROLLED TYPE 2 DIABETES MELLITUS WITH MICROALBUMINURIA, WITHOUT LONG-TERM CURRENT USE OF INSULIN (HCC): ICD-10-CM

## 2019-09-09 DIAGNOSIS — Z23 NEED FOR VACCINATION: ICD-10-CM

## 2019-09-09 DIAGNOSIS — R80.9 CONTROLLED TYPE 2 DIABETES MELLITUS WITH MICROALBUMINURIA, WITHOUT LONG-TERM CURRENT USE OF INSULIN (HCC): ICD-10-CM

## 2019-09-09 DIAGNOSIS — E78.2 MIXED HYPERLIPIDEMIA: ICD-10-CM

## 2019-09-09 DIAGNOSIS — Z00.00 ADULT GENERAL MEDICAL EXAM: Primary | ICD-10-CM

## 2019-09-09 PROCEDURE — 99395 PREV VISIT EST AGE 18-39: CPT | Performed by: FAMILY MEDICINE

## 2019-09-09 PROCEDURE — 90471 IMMUNIZATION ADMIN: CPT | Performed by: FAMILY MEDICINE

## 2019-09-09 PROCEDURE — 90686 IIV4 VACC NO PRSV 0.5 ML IM: CPT | Performed by: FAMILY MEDICINE

## 2019-09-09 RX ORDER — LISINOPRIL 2.5 MG/1
TABLET ORAL
Qty: 90 TABLET | Refills: 0 | Status: SHIPPED | OUTPATIENT
Start: 2019-09-09 | End: 2020-10-08

## 2019-09-09 RX ORDER — PIOGLITAZONEHYDROCHLORIDE 45 MG/1
TABLET ORAL
Qty: 90 TABLET | Refills: 0 | Status: SHIPPED | OUTPATIENT
Start: 2019-09-09 | End: 2020-10-08

## 2019-09-09 RX ORDER — ATORVASTATIN CALCIUM 20 MG/1
TABLET, FILM COATED ORAL
Qty: 90 TABLET | Refills: 0 | Status: SHIPPED | OUTPATIENT
Start: 2019-09-09 | End: 2020-10-08

## 2019-09-09 NOTE — PROGRESS NOTES
Patient ID: Lin Viera is a 42year old male. HPI  Patient presents with:  Physical    He is here for physical exam.  He just recently had his labs done. He is still here to discuss the diabetes.      Diabetes Care Dilated Eye Exam due on 06/20/1982 08/10/2019    Yves 496 289 08/10/2019       No results found for: Kacey Cabrera, [address], [address], [address], [address], [address] [facility], [address] [facility], [address], [address], [address], [address] [facility], [address], [address], [address], [address], [address], [facility] [address], [address] [facility], [address] [facility], [facility], [address] [facility] history.     Social History    Socioeconomic History      Marital status: Single      Spouse name: Not on file      Number of children: Not on file      Years of education: Not on file      Highest education level: Not on file    Occupational History      N BY MOUTH ONCE DAILY FOR DIABETES Disp: 90 tablet Rfl: 0   atorvastatin 20 MG Oral Tab TAKE 1 TABLET BY MOUTH ONCE DAILY AT NIGHT FOR CHOLESTEROL Disp: 90 tablet Rfl: 0   metFORMIN HCl 1000 MG Oral Tab TAKE 1 TABLET BY MOUTH TWICE DAILY WITH MEALS FOR DIABE atorvastatin 20 MG Oral Tab; TAKE 1 TABLET BY MOUTH ONCE DAILY AT NIGHT FOR CHOLESTEROL    Controlled type 2 diabetes mellitus with microalbuminuria, without long-term current use of insulin (HCC)  -     lisinopril 2.5 MG Oral Tab; TAKE 1 TABLET BY MOUTH O

## 2019-10-12 ENCOUNTER — OFFICE VISIT (OUTPATIENT)
Dept: ALLERGY | Facility: CLINIC | Age: 39
End: 2019-10-12
Payer: COMMERCIAL

## 2019-10-12 VITALS
BODY MASS INDEX: 40.88 KG/M2 | RESPIRATION RATE: 16 BRPM | TEMPERATURE: 98 F | SYSTOLIC BLOOD PRESSURE: 121 MMHG | HEIGHT: 69 IN | HEART RATE: 65 BPM | DIASTOLIC BLOOD PRESSURE: 75 MMHG | OXYGEN SATURATION: 96 % | WEIGHT: 276 LBS

## 2019-10-12 DIAGNOSIS — L50.3 DERMATOGRAPHIC URTICARIA: ICD-10-CM

## 2019-10-12 DIAGNOSIS — L50.8 CHRONIC URTICARIA: Primary | ICD-10-CM

## 2019-10-12 PROCEDURE — 99244 OFF/OP CNSLTJ NEW/EST MOD 40: CPT | Performed by: ALLERGY & IMMUNOLOGY

## 2019-10-12 PROCEDURE — 99212 OFFICE O/P EST SF 10 MIN: CPT | Performed by: ALLERGY & IMMUNOLOGY

## 2019-10-12 RX ORDER — LEVOCETIRIZINE DIHYDROCHLORIDE 5 MG/1
5 TABLET, FILM COATED ORAL NIGHTLY
Qty: 30 TABLET | Refills: 0 | Status: SHIPPED | OUTPATIENT
Start: 2019-10-12 | End: 2020-10-08

## 2019-10-12 NOTE — PROGRESS NOTES
Alex Garcia is a 42year old male. HPI:   Patient presents with:  Rash Skin Problem (integumentary): Referred by Dr. Felisha Ayala for skin problem. Patient reports getting raised lines when he scratches himself.       Patient is a 62-year-old male who prese Rfl: 0  atorvastatin 20 MG Oral Tab, TAKE 1 TABLET BY MOUTH ONCE DAILY AT NIGHT FOR CHOLESTEROL, Disp: 90 tablet, Rfl: 0  metFORMIN HCl 1000 MG Oral Tab, TAKE 1 TABLET BY MOUTH TWICE DAILY WITH MEALS FOR DIABETES, Disp: 180 tablet, Rfl: 0  Cholecalciferol present. + dermatogrpahia screen with triple pahse of [place]ia   Extremities: no edema, cyanosis, or clubbing  Neurological:Oriented to time, place, person & situation       ASSESSMENT/PLAN:   Assessment   Chronic urticaria  (primary encounter diagnosis)  Luis

## 2019-10-12 NOTE — PATIENT INSTRUCTIONS
1. Chronic urticaria with dermatographic  component    Handouts on urticaria/angioedema  provided and reviewed including the potential of being idiopathic in nature.     Handouts on urticaria and dermatographia  Handouts on dermatographia being a physical f

## 2020-09-09 ENCOUNTER — TELEPHONE (OUTPATIENT)
Dept: FAMILY MEDICINE CLINIC | Facility: CLINIC | Age: 40
End: 2020-09-09

## 2020-09-09 DIAGNOSIS — Z00.00 ADULT GENERAL MEDICAL EXAM: Primary | ICD-10-CM

## 2020-09-09 DIAGNOSIS — E11.29 CONTROLLED TYPE 2 DIABETES MELLITUS WITH MICROALBUMINURIA, WITHOUT LONG-TERM CURRENT USE OF INSULIN: ICD-10-CM

## 2020-09-09 DIAGNOSIS — R80.9 CONTROLLED TYPE 2 DIABETES MELLITUS WITH MICROALBUMINURIA, WITHOUT LONG-TERM CURRENT USE OF INSULIN: ICD-10-CM

## 2020-09-09 DIAGNOSIS — E78.2 MIXED HYPERLIPIDEMIA: ICD-10-CM

## 2020-09-09 NOTE — TELEPHONE ENCOUNTER
Patient scheduled physical for 9/24. Please order blood work for patient to complete prior to his appointment.  Thank you

## 2020-09-10 NOTE — TELEPHONE ENCOUNTER
Dr. Michael Lopez, patient is schedule for a Physical on 09/24/2020. Patient is requesting blood test order for appointment. Please advise.

## 2020-10-08 ENCOUNTER — OFFICE VISIT (OUTPATIENT)
Dept: FAMILY MEDICINE CLINIC | Facility: CLINIC | Age: 40
End: 2020-10-08
Payer: COMMERCIAL

## 2020-10-08 VITALS
BODY MASS INDEX: 36.29 KG/M2 | HEART RATE: 87 BPM | SYSTOLIC BLOOD PRESSURE: 127 MMHG | WEIGHT: 245 LBS | DIASTOLIC BLOOD PRESSURE: 80 MMHG | HEIGHT: 69 IN | TEMPERATURE: 98 F

## 2020-10-08 DIAGNOSIS — B37.42 CANDIDAL BALANITIS: ICD-10-CM

## 2020-10-08 DIAGNOSIS — Z23 NEED FOR VACCINATION: ICD-10-CM

## 2020-10-08 DIAGNOSIS — B37.89 CANDIDA RASH OF GROIN: ICD-10-CM

## 2020-10-08 DIAGNOSIS — E11.65 UNCONTROLLED TYPE 2 DIABETES MELLITUS WITH HYPERGLYCEMIA, WITHOUT LONG-TERM CURRENT USE OF INSULIN (HCC): ICD-10-CM

## 2020-10-08 DIAGNOSIS — Z00.00 ADULT GENERAL MEDICAL EXAM: Primary | ICD-10-CM

## 2020-10-08 PROCEDURE — 3008F BODY MASS INDEX DOCD: CPT | Performed by: FAMILY MEDICINE

## 2020-10-08 PROCEDURE — 99395 PREV VISIT EST AGE 18-39: CPT | Performed by: FAMILY MEDICINE

## 2020-10-08 PROCEDURE — 3074F SYST BP LT 130 MM HG: CPT | Performed by: FAMILY MEDICINE

## 2020-10-08 PROCEDURE — 99213 OFFICE O/P EST LOW 20 MIN: CPT | Performed by: FAMILY MEDICINE

## 2020-10-08 PROCEDURE — 3079F DIAST BP 80-89 MM HG: CPT | Performed by: FAMILY MEDICINE

## 2020-10-08 PROCEDURE — 90686 IIV4 VACC NO PRSV 0.5 ML IM: CPT | Performed by: FAMILY MEDICINE

## 2020-10-08 PROCEDURE — 90471 IMMUNIZATION ADMIN: CPT | Performed by: FAMILY MEDICINE

## 2020-10-08 RX ORDER — NYSTATIN 100000 U/G
CREAM TOPICAL
Qty: 30 G | Refills: 2 | Status: SHIPPED | OUTPATIENT
Start: 2020-10-08 | End: 2020-10-19

## 2020-10-08 NOTE — PATIENT INSTRUCTIONS
Your labs are in the system from September 12, 2020. It will be blood in urine. Do it fasting for 10 hours.

## 2020-10-08 NOTE — PROGRESS NOTES
Patient ID: Jaye Ho is a 47year old male. HPI  Patient presents with:  Physical    Last physical on 09/09/2019. Pt is a , is , and is smokes cigarettes in frequently while drinking alcohol.     Pt states his job is very CREATSERUM 0.84 08/10/2019    ANIONGAP 5 08/10/2019    GFRNAA 112 08/10/2019    GFRAA 129 08/10/2019    CA 9.1 08/10/2019    OSMOCALC 289 08/10/2019    ALKPHO 98 08/10/2019    AST 24 08/10/2019    ALT 34 08/10/2019    BILT 1.0 08/10/2019    TP 8.0 08/10/20 Constitutional: Negative for chills and fever. HENT: Negative for voice change. Respiratory: Negative for chest tightness and shortness of breath. Cardiovascular: Negative for chest pain. Gastrointestinal: Negative for abdominal pain.    Skin: P file        Emotionally abused: Not on file        Physically abused: Not on file        Forced sexual activity: Not on file    Other Topics      Concerns:        Grew up on a farm: Not Asked        History of tanning: Not Asked        Outdoor occupation: Hopefully his weight loss is due to his better diet and not because of uncontrolled diabetes. He lost his insurance and has been off medications for some time.   Candida rash of groin  -     nystatin 449323 UNIT/GM External Cream; Apply twice daily to the

## 2020-10-19 ENCOUNTER — NURSE TRIAGE (OUTPATIENT)
Dept: FAMILY MEDICINE CLINIC | Facility: CLINIC | Age: 40
End: 2020-10-19

## 2020-10-19 ENCOUNTER — TELEMEDICINE (OUTPATIENT)
Dept: FAMILY MEDICINE CLINIC | Facility: CLINIC | Age: 40
End: 2020-10-19
Payer: COMMERCIAL

## 2020-10-19 VITALS — WEIGHT: 245 LBS | BODY MASS INDEX: 36.29 KG/M2 | HEIGHT: 69 IN

## 2020-10-19 DIAGNOSIS — R43.2 LOSS OF TASTE: Primary | ICD-10-CM

## 2020-10-19 PROCEDURE — 99213 OFFICE O/P EST LOW 20 MIN: CPT | Performed by: STUDENT IN AN ORGANIZED HEALTH CARE EDUCATION/TRAINING PROGRAM

## 2020-10-19 NOTE — TELEPHONE ENCOUNTER
Action Requested: Summary for Provider     []  Critical Lab, Recommendations Needed  [] Need Additional Advice  [x]   FYI    []   Need Orders  [] Need Medications Sent to Pharmacy  []  Other     SUMMARY:     Spoke with pt,  verified, pt c/o on and off s

## 2020-10-19 NOTE — PROGRESS NOTES
Virtual Telephone Check-In    Amber Ho verbally consents to a Virtual/Telephone Check-In visit on 10/19/20. Patient has been referred to the [facility] website at [email or web address] to review the yearly Consent to Treat document.     Patient More Patty detailed in the plan of care above. Coding/billing information is submitted for this visit based on complexity of care and/or time spent for the visit. HPI:    Patient ID: Christian Ricardo is a 47year old male.     HPI  Pt presenting via video visit with distancing, covering mouth when coughing/sneezing, and avoiding social meetings and gatherings in the face of this [address] pandemic. Patient verbalized understanding of recommendations and agrees to plan. All questions were answered.     - SARS-COV-2 BY HIPOLITO

## 2020-10-20 ENCOUNTER — APPOINTMENT (OUTPATIENT)
Dept: LAB | Age: 40
End: 2020-10-20
Attending: STUDENT IN AN ORGANIZED HEALTH CARE EDUCATION/TRAINING PROGRAM
Payer: COMMERCIAL

## 2020-10-20 DIAGNOSIS — R43.2 LOSS OF TASTE: ICD-10-CM

## 2020-10-24 ENCOUNTER — APPOINTMENT (OUTPATIENT)
Dept: LAB | Age: 40
End: 2020-10-24
Attending: FAMILY MEDICINE
Payer: COMMERCIAL

## 2020-10-24 LAB
ALBUMIN SERPL-MCNC: 3.8 G/DL (ref 3.4–5)
ALBUMIN/GLOB SERPL: 1 {RATIO} (ref 1–2)
ALP LIVER SERPL-CCNC: 143 U/L
ALT SERPL-CCNC: 118 U/L
ANION GAP SERPL CALC-SCNC: 7 MMOL/L (ref 0–18)
AST SERPL-CCNC: 78 U/L (ref 15–37)
BASOPHILS # BLD AUTO: 0.04 X10(3) UL (ref 0–0.2)
BASOPHILS NFR BLD AUTO: 0.6 %
BILIRUB SERPL-MCNC: 1 MG/DL (ref 0.1–2)
BUN BLD-MCNC: 13 MG/DL (ref 7–18)
BUN/CREAT SERPL: 19.4 (ref 10–20)
CALCIUM BLD-MCNC: 9.2 MG/DL (ref 8.5–10.1)
CHLORIDE SERPL-SCNC: 101 MMOL/L (ref 98–112)
CHOLEST SMN-MCNC: 197 MG/DL (ref ?–200)
CO2 SERPL-SCNC: 28 MMOL/L (ref 21–32)
CREAT BLD-MCNC: 0.67 MG/DL
CREAT UR-SCNC: 105 MG/DL
DEPRECATED RDW RBC AUTO: 39.3 FL (ref 35.1–46.3)
EOSINOPHIL # BLD AUTO: 0.22 X10(3) UL (ref 0–0.7)
EOSINOPHIL NFR BLD AUTO: 3.3 %
ERYTHROCYTE [DISTWIDTH] IN BLOOD BY AUTOMATED COUNT: 11.7 % (ref 11–15)
EST. AVERAGE GLUCOSE BLD GHB EST-MCNC: 289 MG/DL (ref 68–126)
GLOBULIN PLAS-MCNC: 4 G/DL (ref 2.8–4.4)
GLUCOSE BLD-MCNC: 304 MG/DL (ref 70–99)
HBA1C MFR BLD HPLC: 11.7 % (ref ?–5.7)
HCT VFR BLD AUTO: 47.4 %
HDLC SERPL-MCNC: 38 MG/DL (ref 40–59)
HGB BLD-MCNC: 16.4 G/DL
IMM GRANULOCYTES # BLD AUTO: 0.02 X10(3) UL (ref 0–1)
IMM GRANULOCYTES NFR BLD: 0.3 %
LDLC SERPL CALC-MCNC: 116 MG/DL (ref ?–100)
LYMPHOCYTES # BLD AUTO: 2 X10(3) UL (ref 1–4)
LYMPHOCYTES NFR BLD AUTO: 30.3 %
M PROTEIN MFR SERPL ELPH: 7.8 G/DL (ref 6.4–8.2)
MCH RBC QN AUTO: 31.7 PG (ref 26–34)
MCHC RBC AUTO-ENTMCNC: 34.6 G/DL (ref 31–37)
MCV RBC AUTO: 91.7 FL
MICROALBUMIN UR-MCNC: 2.89 MG/DL
MICROALBUMIN/CREAT 24H UR-RTO: 27.5 UG/MG (ref ?–30)
MONOCYTES # BLD AUTO: 0.44 X10(3) UL (ref 0.1–1)
MONOCYTES NFR BLD AUTO: 6.7 %
NEUTROPHILS # BLD AUTO: 3.88 X10 (3) UL (ref 1.5–7.7)
NEUTROPHILS # BLD AUTO: 3.88 X10(3) UL (ref 1.5–7.7)
NEUTROPHILS NFR BLD AUTO: 58.8 %
NONHDLC SERPL-MCNC: 159 MG/DL (ref ?–130)
OSMOLALITY SERPL CALC.SUM OF ELEC: 294 MOSM/KG (ref 275–295)
PATIENT FASTING Y/N/NP: YES
PATIENT FASTING Y/N/NP: YES
PLATELET # BLD AUTO: 137 10(3)UL (ref 150–450)
POTASSIUM SERPL-SCNC: 3.9 MMOL/L (ref 3.5–5.1)
RBC # BLD AUTO: 5.17 X10(6)UL
SODIUM SERPL-SCNC: 136 MMOL/L (ref 136–145)
TRIGL SERPL-MCNC: 215 MG/DL (ref 30–149)
TSI SER-ACNC: 2.17 MIU/ML (ref 0.36–3.74)
VLDLC SERPL CALC-MCNC: 43 MG/DL (ref 0–30)
WBC # BLD AUTO: 6.6 X10(3) UL (ref 4–11)

## 2020-10-24 PROCEDURE — 80053 COMPREHEN METABOLIC PANEL: CPT | Performed by: FAMILY MEDICINE

## 2020-10-24 PROCEDURE — 84443 ASSAY THYROID STIM HORMONE: CPT | Performed by: FAMILY MEDICINE

## 2020-10-24 PROCEDURE — 36415 COLL VENOUS BLD VENIPUNCTURE: CPT | Performed by: FAMILY MEDICINE

## 2020-10-24 PROCEDURE — 85025 COMPLETE CBC W/AUTO DIFF WBC: CPT | Performed by: FAMILY MEDICINE

## 2020-10-24 PROCEDURE — 82043 UR ALBUMIN QUANTITATIVE: CPT | Performed by: FAMILY MEDICINE

## 2020-10-24 PROCEDURE — 82570 ASSAY OF URINE CREATININE: CPT | Performed by: FAMILY MEDICINE

## 2020-10-24 PROCEDURE — 83036 HEMOGLOBIN GLYCOSYLATED A1C: CPT | Performed by: FAMILY MEDICINE

## 2020-10-24 PROCEDURE — 80061 LIPID PANEL: CPT | Performed by: FAMILY MEDICINE

## 2020-10-30 ENCOUNTER — TELEMEDICINE (OUTPATIENT)
Dept: FAMILY MEDICINE CLINIC | Facility: CLINIC | Age: 40
End: 2020-10-30
Payer: COMMERCIAL

## 2020-10-30 DIAGNOSIS — E11.65 UNCONTROLLED TYPE 2 DIABETES MELLITUS WITH HYPERGLYCEMIA, WITHOUT LONG-TERM CURRENT USE OF INSULIN (HCC): Primary | ICD-10-CM

## 2020-10-30 DIAGNOSIS — E78.2 MIXED HYPERLIPIDEMIA: ICD-10-CM

## 2020-10-30 PROCEDURE — 99213 OFFICE O/P EST LOW 20 MIN: CPT | Performed by: FAMILY MEDICINE

## 2020-10-30 RX ORDER — PIOGLITAZONEHYDROCHLORIDE 45 MG/1
TABLET ORAL
Qty: 90 TABLET | Refills: 1 | Status: SHIPPED | OUTPATIENT
Start: 2020-10-30 | End: 2021-05-24

## 2020-10-30 RX ORDER — LISINOPRIL 2.5 MG/1
TABLET ORAL
Qty: 90 TABLET | Refills: 1 | Status: SHIPPED | OUTPATIENT
Start: 2020-10-30 | End: 2021-05-24

## 2020-10-30 RX ORDER — ATORVASTATIN CALCIUM 20 MG/1
TABLET, FILM COATED ORAL
Qty: 90 TABLET | Refills: 1 | Status: SHIPPED | OUTPATIENT
Start: 2020-10-30 | End: 2021-05-24

## 2020-10-30 NOTE — PROGRESS NOTES
Phone VISIT PROGRESS NOTE  Todays date: 10/30/2020 10:09 AM        Most recent Nurse Triage message / Alex Clark message from patient:      Valerie Perez RN   Registered Nurse      Telephone Encounter   Signed   Encounter Date:  10/25/2020 COVID-19 infection or some type of other illness are too ill to be on video and would rather have a designated person speak for them and in that case we will be speaking to that designated person and all parties involved understand the disclaimer that goes 10/24/2020    CO2 28.0 10/24/2020       Lab Results   Component Value Date     (H) 10/24/2020    BUN 13 10/24/2020    CREATSERUM 0.67 (L) 10/24/2020    BUNCREA 19.4 10/24/2020    ANIONGAP 7 10/24/2020    GFRAA 141 10/24/2020    GFRNAA 122 10/24/2020 and all orders for this visit:    Uncontrolled type 2 diabetes mellitus with hyperglycemia, without long-term current use of insulin (HCC)  -     BASIC METABOLIC PANEL (8);  Future  -     HEMOGLOBIN A1C; Future  -     OPHTHALMOLOGY - INTERNAL  Ordered labs History:  Social History    Tobacco Use      Smoking status: Former Smoker        Types: Cigarettes      Smokeless tobacco: Never Used    Alcohol use:  Yes      Alcohol/week: 0.0 standard drinks      Comment: beer    Drug use: No     Reviewed Current [employer] documentation has been prepared under the direction and in the presence of Akil Deleon DO. Electronically Signed: Nathalie Donovan, 10/30/2020, 10:09 AM.    IAkil DO,  personally performed the services described in this documentation.  All me

## 2020-11-11 ENCOUNTER — TELEPHONE (OUTPATIENT)
Dept: FAMILY MEDICINE CLINIC | Facility: CLINIC | Age: 40
End: 2020-11-11

## 2020-11-11 ENCOUNTER — TELEMEDICINE (OUTPATIENT)
Dept: FAMILY MEDICINE CLINIC | Facility: CLINIC | Age: 40
End: 2020-11-11
Payer: COMMERCIAL

## 2020-11-11 DIAGNOSIS — Z20.822 CLOSE EXPOSURE TO 2019 NOVEL CORONAVIRUS: Primary | ICD-10-CM

## 2020-11-11 PROCEDURE — 99213 OFFICE O/P EST LOW 20 MIN: CPT | Performed by: PHYSICIAN ASSISTANT

## 2020-11-12 ENCOUNTER — APPOINTMENT (OUTPATIENT)
Dept: LAB | Age: 40
End: 2020-11-12
Attending: FAMILY MEDICINE
Payer: COMMERCIAL

## 2020-11-12 DIAGNOSIS — Z20.822 EXPOSURE TO COVID-19 VIRUS: ICD-10-CM

## 2020-11-12 NOTE — PROGRESS NOTES
HPI: HPI    I spoke Landon Pagan by telephone , verified date of birth, and discussed current concerns. Onset/duration of current symptoms: 6 days.     Common COVID-19 symptoms per CDC: CDC Clinical Guidance  • Fever:     Yes []     No [x]       • Cough Spouse name: Not on file      Number of children: Not on file      Years of education: Not on file      Highest education level: Not on file    Occupational History      Not on file    Social Needs      Financial resource strain: Not on file      Food inse chest tightness, shortness of breath and wheezing. Cardiovascular: Negative for chest pain and palpitations. Gastrointestinal: Negative for nausea, vomiting, abdominal pain, diarrhea, constipation, blood in stool and abdominal distention.    Skin: Nega

## 2020-11-24 ENCOUNTER — NURSE TRIAGE (OUTPATIENT)
Dept: FAMILY MEDICINE CLINIC | Facility: CLINIC | Age: 40
End: 2020-11-24

## 2020-11-24 ENCOUNTER — APPOINTMENT (OUTPATIENT)
Dept: LAB | Facility: HOSPITAL | Age: 40
End: 2020-11-24
Attending: PHYSICIAN ASSISTANT
Payer: COMMERCIAL

## 2020-11-24 ENCOUNTER — TELEMEDICINE (OUTPATIENT)
Dept: FAMILY MEDICINE CLINIC | Facility: CLINIC | Age: 40
End: 2020-11-24
Payer: COMMERCIAL

## 2020-11-24 DIAGNOSIS — R07.9 CHEST PAIN, UNSPECIFIED TYPE: Primary | ICD-10-CM

## 2020-11-24 DIAGNOSIS — R07.9 CHEST PAIN, UNSPECIFIED TYPE: ICD-10-CM

## 2020-11-24 PROCEDURE — 93010 ELECTROCARDIOGRAM REPORT: CPT | Performed by: PHYSICIAN ASSISTANT

## 2020-11-24 PROCEDURE — 93005 ELECTROCARDIOGRAM TRACING: CPT

## 2020-11-24 PROCEDURE — 99213 OFFICE O/P EST LOW 20 MIN: CPT | Performed by: PHYSICIAN ASSISTANT

## 2020-11-24 NOTE — TELEPHONE ENCOUNTER
Action Requested: Summary for Provider     []  Critical Lab, Recommendations Needed  [] Need Additional Advice  []   FYI    []   Need Orders  [] Need Medications Sent to Pharmacy  []  Other     SUMMARY:       Virtual appointment made for today  11/24/20

## 2020-11-26 NOTE — PROGRESS NOTES
HPI: HPI    I spoke to Marycruz Serrano via video call, verified date of birth, and discussed current concerns. Patient states that he started to have intermittent chest pain since yesterday. It's sharp pain, lasted couple second then goes away.  The pain d file      Transportation needs        Medical: Not on file        Non-medical: Not on file    Tobacco Use      Smoking status: Former Smoker        Types: Cigarettes      Smokeless tobacco: Never Used    Substance and Sexual Activity      Alcohol use:  Yes and headaches. There were no vitals filed for this visit. There is no height or weight on file to calculate BMI. Physical Exam:   Physical Exam    Constitutional: He is oriented to person, place, and time.  He appears well-developed and well-nida

## 2021-05-08 ENCOUNTER — LAB ENCOUNTER (OUTPATIENT)
Dept: LAB | Age: 41
End: 2021-05-08
Attending: FAMILY MEDICINE
Payer: COMMERCIAL

## 2021-05-08 DIAGNOSIS — E11.65 UNCONTROLLED TYPE 2 DIABETES MELLITUS WITH HYPERGLYCEMIA, WITHOUT LONG-TERM CURRENT USE OF INSULIN (HCC): ICD-10-CM

## 2021-05-08 DIAGNOSIS — E78.2 MIXED HYPERLIPIDEMIA: ICD-10-CM

## 2021-05-08 PROCEDURE — 84450 TRANSFERASE (AST) (SGOT): CPT

## 2021-05-08 PROCEDURE — 80061 LIPID PANEL: CPT

## 2021-05-08 PROCEDURE — 84460 ALANINE AMINO (ALT) (SGPT): CPT

## 2021-05-08 PROCEDURE — 80048 BASIC METABOLIC PNL TOTAL CA: CPT

## 2021-05-08 PROCEDURE — 36415 COLL VENOUS BLD VENIPUNCTURE: CPT

## 2021-05-08 PROCEDURE — 83036 HEMOGLOBIN GLYCOSYLATED A1C: CPT

## 2021-05-24 ENCOUNTER — OFFICE VISIT (OUTPATIENT)
Dept: FAMILY MEDICINE CLINIC | Facility: CLINIC | Age: 41
End: 2021-05-24
Payer: COMMERCIAL

## 2021-05-24 VITALS
HEART RATE: 72 BPM | HEIGHT: 69 IN | SYSTOLIC BLOOD PRESSURE: 127 MMHG | WEIGHT: 258 LBS | BODY MASS INDEX: 38.21 KG/M2 | TEMPERATURE: 98 F | DIASTOLIC BLOOD PRESSURE: 79 MMHG

## 2021-05-24 DIAGNOSIS — E78.2 MIXED HYPERLIPIDEMIA: ICD-10-CM

## 2021-05-24 DIAGNOSIS — R07.89 XIPHOID PAIN: ICD-10-CM

## 2021-05-24 DIAGNOSIS — E11.65 UNCONTROLLED TYPE 2 DIABETES MELLITUS WITH HYPERGLYCEMIA, WITHOUT LONG-TERM CURRENT USE OF INSULIN (HCC): Primary | ICD-10-CM

## 2021-05-24 PROCEDURE — 3078F DIAST BP <80 MM HG: CPT | Performed by: FAMILY MEDICINE

## 2021-05-24 PROCEDURE — 3008F BODY MASS INDEX DOCD: CPT | Performed by: FAMILY MEDICINE

## 2021-05-24 PROCEDURE — 3074F SYST BP LT 130 MM HG: CPT | Performed by: FAMILY MEDICINE

## 2021-05-24 PROCEDURE — 99214 OFFICE O/P EST MOD 30 MIN: CPT | Performed by: FAMILY MEDICINE

## 2021-05-24 RX ORDER — PIOGLITAZONEHYDROCHLORIDE 45 MG/1
TABLET ORAL
Qty: 90 TABLET | Refills: 1 | Status: SHIPPED | OUTPATIENT
Start: 2021-05-24

## 2021-05-24 RX ORDER — LISINOPRIL 2.5 MG/1
TABLET ORAL
Qty: 90 TABLET | Refills: 1 | Status: SHIPPED | OUTPATIENT
Start: 2021-05-24

## 2021-05-24 RX ORDER — ATORVASTATIN CALCIUM 20 MG/1
TABLET, FILM COATED ORAL
Qty: 90 TABLET | Refills: 1 | Status: SHIPPED | OUTPATIENT
Start: 2021-05-24

## 2021-05-24 RX ORDER — CANAGLIFLOZIN AND METFORMIN HYDROCHLORIDE 150; 1000 MG/1; MG/1
TABLET, FILM COATED, EXTENDED RELEASE ORAL DAILY
Qty: 180 TABLET | Refills: 1 | Status: SHIPPED | OUTPATIENT
Start: 2021-05-24 | End: 2021-06-23

## 2021-05-24 NOTE — PROGRESS NOTES
Patient ID: Juan Miguel Wright is a 47year old male. HPI  Patient presents with: Follow - Up: Diabetes    Last seen by me on 10/8/2020. Patient denies shortness of breath, diaphoresis, dizziness, and hypoglycemia.  Pt c/o intermittent pain on the latera Medication Sig Dispense Refill   • lisinopril 2.5 MG Oral Tab TAKE 1 TABLET BY MOUTH ONCE DAILY FOR BLOOD PRESSURE/KIDNEY PROTECTION 90 tablet 1   • Pioglitazone HCl 45 MG Oral Tab TAKE 1 TABLET BY MOUTH ONCE DAILY FOR DIABETES 90 tablet 1   • atorvastat (Equivalent of 2 tablets at the same time daily) for diabetes. (Failed on Metformin and Actos with hemoglobin A1c above 9.)  -     OPHTHALMOLOGY - INTERNAL  -     HEMOGLOBIN A1C; Future  -     BASIC METABOLIC PANEL (8); Future  Diabetes is uncontrolled. 5:15 PM.    I, Tyshawn Riley DO,  personally performed the services described in this documentation. All medical record entries made by the scribe were at my direction and in my presence.   I have reviewed the chart and discharge instructions (if applicabl

## 2021-05-24 NOTE — PATIENT INSTRUCTIONS
HEALTH TIPS     Exercise, work on your diet, watch your portion sizes. Avoid eating late at night. Make sure to EAT BREAKFAST.      At lunch and dinner, you are BETTER OFF eating lean pieces of meat such as fish, chicken, ham, turkey, po

## 2021-07-06 NOTE — ED AVS SNAPSHOT
Deja Cano   MRN: [id number]    Department:  [facility] Emergency Department   Date of Visit:  11/17/2018           Disclosure     Insurance plans vary and the physician(s) referred by the ER may not be covered by your plan.  Please contact y Last office visit: 3-3-21  Next office visit: 9-3-21     Disp Refills Start End    lansoprazole (PREVACID) 30 MG capsule 180 capsule 0 3/3/2021     Sig - Route: Take 1 capsule by mouth 2 times daily. - Oral         CARE PHYSICIAN AT ONCE OR RETURN IMMEDIATELY TO THE EMERGENCY DEPARTMENT. If you have been prescribed any medication(s), please fill your prescription right away and begin taking the medication(s) as directed.   If you believe that any of the medications

## 2021-09-22 ENCOUNTER — HOSPITAL ENCOUNTER (OUTPATIENT)
Age: 41
Discharge: HOME | End: 2021-09-22
Attending: PHYSICIAN ASSISTANT
Payer: COMMERCIAL

## 2021-09-22 ENCOUNTER — NURSE TRIAGE (OUTPATIENT)
Dept: FAMILY MEDICINE CLINIC | Facility: CLINIC | Age: 41
End: 2021-09-22

## 2021-09-22 VITALS
OXYGEN SATURATION: 100 % | SYSTOLIC BLOOD PRESSURE: 119 MMHG | DIASTOLIC BLOOD PRESSURE: 69 MMHG | TEMPERATURE: 98 F | HEART RATE: 88 BPM | RESPIRATION RATE: 16 BRPM

## 2021-09-22 DIAGNOSIS — L55.9 SUNBURN: Primary | ICD-10-CM

## 2021-09-22 PROCEDURE — 99203 OFFICE O/P NEW LOW 30 MIN: CPT | Performed by: PHYSICIAN ASSISTANT

## 2021-09-22 RX ORDER — IBUPROFEN 600 MG/1
TABLET ORAL
Qty: 20 TABLET | Refills: 0 | Status: SHIPPED | OUTPATIENT
Start: 2021-09-22

## 2021-09-22 NOTE — TELEPHONE ENCOUNTER
Action Requested: Summary for Provider     []  Critical Lab, Recommendations Needed  [] Need Additional Advice  []   FYI    []   Need Orders  [] Need Medications Sent to Pharmacy  []  Other     SUMMARY: advised pt to go to UC for facial swelling.  Pt states

## 2021-09-22 NOTE — ED PROVIDER NOTES
Patient Seen in: [facility]    History   Patient presents with:  Skin Problem    Stated Complaint: FACIAL SUNBURN     HPI    Claudia Soto is a 46year old male who presents with chief complaint of sunburn. Onset 4 days ago.   Patient states No      Review of Systems    Positive for stated complaint: FACIAL SUNBURN   Other systems are as noted in HPI. Constitutional and vital signs reviewed. All other systems reviewed and negative except as noted above.     [facility] from tod wound, purulent drainage, induration, fluctuance or erythematous tracking. No obvious facial swelling. No obvious bruising.            ED Course   Labs Reviewed - No data to display    MDM     Physical exam remained stable over serial reexaminations as pr

## 2022-03-19 ENCOUNTER — LAB ENCOUNTER (OUTPATIENT)
Dept: LAB | Age: 42
End: 2022-03-19
Attending: FAMILY MEDICINE
Payer: COMMERCIAL

## 2022-03-19 DIAGNOSIS — E11.65 UNCONTROLLED TYPE 2 DIABETES MELLITUS WITH HYPERGLYCEMIA, WITHOUT LONG-TERM CURRENT USE OF INSULIN (HCC): ICD-10-CM

## 2022-03-19 LAB
ANION GAP SERPL CALC-SCNC: 5 MMOL/L (ref 0–18)
BUN BLD-MCNC: 16 MG/DL (ref 7–18)
BUN/CREAT SERPL: 21.1 (ref 10–20)
CALCIUM BLD-MCNC: 9.2 MG/DL (ref 8.5–10.1)
CHLORIDE SERPL-SCNC: 105 MMOL/L (ref 98–112)
CO2 SERPL-SCNC: 25 MMOL/L (ref 21–32)
CREAT BLD-MCNC: 0.76 MG/DL
EST. AVERAGE GLUCOSE BLD GHB EST-MCNC: 163 MG/DL (ref 68–126)
FASTING STATUS PATIENT QL REPORTED: YES
GLUCOSE BLD-MCNC: 144 MG/DL (ref 70–99)
HBA1C MFR BLD: 7.3 % (ref ?–5.7)
OSMOLALITY SERPL CALC.SUM OF ELEC: 284 MOSM/KG (ref 275–295)
POTASSIUM SERPL-SCNC: 4.2 MMOL/L (ref 3.5–5.1)
SODIUM SERPL-SCNC: 135 MMOL/L (ref 136–145)

## 2022-03-19 PROCEDURE — 80048 BASIC METABOLIC PNL TOTAL CA: CPT

## 2022-03-19 PROCEDURE — 83036 HEMOGLOBIN GLYCOSYLATED A1C: CPT

## 2022-03-19 PROCEDURE — 3051F HG A1C>EQUAL 7.0%<8.0%: CPT | Performed by: FAMILY MEDICINE

## 2022-03-19 PROCEDURE — 36415 COLL VENOUS BLD VENIPUNCTURE: CPT

## 2022-04-04 ENCOUNTER — OFFICE VISIT (OUTPATIENT)
Dept: FAMILY MEDICINE CLINIC | Facility: CLINIC | Age: 42
End: 2022-04-04
Payer: COMMERCIAL

## 2022-04-04 VITALS
HEART RATE: 79 BPM | BODY MASS INDEX: 39.55 KG/M2 | HEIGHT: 69 IN | WEIGHT: 267 LBS | SYSTOLIC BLOOD PRESSURE: 134 MMHG | DIASTOLIC BLOOD PRESSURE: 85 MMHG | TEMPERATURE: 97 F

## 2022-04-04 DIAGNOSIS — B35.6 TINEA CRURIS: ICD-10-CM

## 2022-04-04 DIAGNOSIS — E78.2 MIXED HYPERLIPIDEMIA: ICD-10-CM

## 2022-04-04 DIAGNOSIS — E11.65 UNCONTROLLED TYPE 2 DIABETES MELLITUS WITH HYPERGLYCEMIA, WITHOUT LONG-TERM CURRENT USE OF INSULIN (HCC): Primary | ICD-10-CM

## 2022-04-04 PROCEDURE — 99214 OFFICE O/P EST MOD 30 MIN: CPT | Performed by: FAMILY MEDICINE

## 2022-04-04 PROCEDURE — 3075F SYST BP GE 130 - 139MM HG: CPT | Performed by: FAMILY MEDICINE

## 2022-04-04 PROCEDURE — 3079F DIAST BP 80-89 MM HG: CPT | Performed by: FAMILY MEDICINE

## 2022-04-04 PROCEDURE — 3008F BODY MASS INDEX DOCD: CPT | Performed by: FAMILY MEDICINE

## 2022-04-04 RX ORDER — ATORVASTATIN CALCIUM 20 MG/1
TABLET, FILM COATED ORAL
Qty: 90 TABLET | Refills: 1 | Status: SHIPPED | OUTPATIENT
Start: 2022-04-04

## 2022-04-04 RX ORDER — LISINOPRIL 2.5 MG/1
TABLET ORAL
Qty: 90 TABLET | Refills: 1 | Status: SHIPPED | OUTPATIENT
Start: 2022-04-04

## 2022-04-04 RX ORDER — DAPAGLIFLOZIN AND METFORMIN HYDROCHLORIDE 5; 1000 MG/1; MG/1
TABLET, FILM COATED, EXTENDED RELEASE ORAL DAILY
Qty: 180 TABLET | Refills: 1 | Status: SHIPPED | OUTPATIENT
Start: 2022-04-04 | End: 2022-05-04

## 2022-04-04 RX ORDER — PIOGLITAZONEHYDROCHLORIDE 45 MG/1
TABLET ORAL
Qty: 90 TABLET | Refills: 1 | Status: SHIPPED | OUTPATIENT
Start: 2022-04-04

## 2022-05-13 ENCOUNTER — OFFICE VISIT (OUTPATIENT)
Dept: PODIATRY CLINIC | Facility: CLINIC | Age: 42
End: 2022-05-13
Payer: COMMERCIAL

## 2022-05-13 DIAGNOSIS — M21.612 BILATERAL BUNIONS: Primary | ICD-10-CM

## 2022-05-13 DIAGNOSIS — M21.611 BILATERAL BUNIONS: Primary | ICD-10-CM

## 2022-05-13 DIAGNOSIS — E11.65 UNCONTROLLED TYPE 2 DIABETES MELLITUS WITH HYPERGLYCEMIA, WITHOUT LONG-TERM CURRENT USE OF INSULIN (HCC): ICD-10-CM

## 2022-05-13 PROCEDURE — 99203 OFFICE O/P NEW LOW 30 MIN: CPT | Performed by: PODIATRIST

## 2022-06-06 ENCOUNTER — TELEMEDICINE (OUTPATIENT)
Dept: FAMILY MEDICINE CLINIC | Facility: CLINIC | Age: 42
End: 2022-06-06

## 2022-06-06 DIAGNOSIS — N50.89 MASS OF RIGHT TESTICLE: Primary | ICD-10-CM

## 2022-07-15 ENCOUNTER — HOSPITAL ENCOUNTER (OUTPATIENT)
Dept: ULTRASOUND IMAGING | Facility: HOSPITAL | Age: 42
Discharge: HOME | End: 2022-07-15
Attending: FAMILY MEDICINE
Payer: COMMERCIAL

## 2022-07-15 DIAGNOSIS — N50.89 MASS OF RIGHT TESTICLE: ICD-10-CM

## 2022-07-15 PROCEDURE — 76870 US EXAM SCROTUM: CPT | Performed by: FAMILY MEDICINE

## 2022-07-15 PROCEDURE — 93975 VASCULAR STUDY: CPT | Performed by: FAMILY MEDICINE

## 2022-07-29 ENCOUNTER — LAB ENCOUNTER (OUTPATIENT)
Dept: LAB | Age: 42
End: 2022-07-29
Attending: FAMILY MEDICINE
Payer: COMMERCIAL

## 2022-07-29 ENCOUNTER — OFFICE VISIT (OUTPATIENT)
Dept: FAMILY MEDICINE CLINIC | Facility: CLINIC | Age: 42
End: 2022-07-29
Payer: COMMERCIAL

## 2022-07-29 VITALS
WEIGHT: 262 LBS | HEIGHT: 69 IN | SYSTOLIC BLOOD PRESSURE: 124 MMHG | HEART RATE: 89 BPM | BODY MASS INDEX: 38.8 KG/M2 | DIASTOLIC BLOOD PRESSURE: 81 MMHG | TEMPERATURE: 97 F

## 2022-07-29 DIAGNOSIS — M79.18 RIGHT BUTTOCK PAIN: ICD-10-CM

## 2022-07-29 DIAGNOSIS — N50.89 MASS OF RIGHT TESTICLE: ICD-10-CM

## 2022-07-29 DIAGNOSIS — N50.811 PAIN IN RIGHT TESTICLE: Primary | ICD-10-CM

## 2022-07-29 DIAGNOSIS — N50.811 PAIN IN RIGHT TESTICLE: ICD-10-CM

## 2022-07-29 LAB
BILIRUB UR QL: NEGATIVE
CLARITY UR: CLEAR
COLOR UR: YELLOW
GLUCOSE UR-MCNC: 500 MG/DL
HGB UR QL STRIP.AUTO: NEGATIVE
KETONES UR-MCNC: NEGATIVE MG/DL
LEUKOCYTE ESTERASE UR QL STRIP.AUTO: NEGATIVE
NITRITE UR QL STRIP.AUTO: NEGATIVE
PH UR: 5.5 [PH] (ref 5–8)
PROT UR-MCNC: NEGATIVE MG/DL
SP GR UR STRIP: 1.02 (ref 1–1.03)
UROBILINOGEN UR STRIP-ACNC: 0.2

## 2022-07-29 PROCEDURE — 81003 URINALYSIS AUTO W/O SCOPE: CPT

## 2022-07-29 PROCEDURE — 3008F BODY MASS INDEX DOCD: CPT | Performed by: FAMILY MEDICINE

## 2022-07-29 PROCEDURE — 3079F DIAST BP 80-89 MM HG: CPT | Performed by: FAMILY MEDICINE

## 2022-07-29 PROCEDURE — 99214 OFFICE O/P EST MOD 30 MIN: CPT | Performed by: FAMILY MEDICINE

## 2022-07-29 PROCEDURE — 3074F SYST BP LT 130 MM HG: CPT | Performed by: FAMILY MEDICINE

## 2022-07-29 RX ORDER — CELECOXIB 200 MG/1
200 CAPSULE ORAL DAILY
Qty: 90 CAPSULE | Refills: 0 | Status: SHIPPED | OUTPATIENT
Start: 2022-07-29

## 2022-07-29 RX ORDER — DAPAGLIFLOZIN AND METFORMIN HYDROCHLORIDE 5; 1000 MG/1; MG/1
TABLET, FILM COATED, EXTENDED RELEASE ORAL
COMMUNITY
Start: 2022-07-08

## 2022-10-11 ENCOUNTER — OFFICE VISIT (OUTPATIENT)
Dept: SURGERY | Facility: CLINIC | Age: 42
End: 2022-10-11
Payer: COMMERCIAL

## 2022-10-11 VITALS — BODY MASS INDEX: 37.51 KG/M2 | HEIGHT: 70 IN | WEIGHT: 262 LBS

## 2022-10-11 DIAGNOSIS — N50.811 RIGHT TESTICULAR PAIN: Primary | ICD-10-CM

## 2022-10-11 LAB
BILIRUB UR QL: NEGATIVE
CLARITY UR: CLEAR
COLOR UR: YELLOW
GLUCOSE UR-MCNC: >=500 MG/DL
HGB UR QL STRIP.AUTO: NEGATIVE
KETONES UR-MCNC: NEGATIVE MG/DL
LEUKOCYTE ESTERASE UR QL STRIP.AUTO: NEGATIVE
NITRITE UR QL STRIP.AUTO: NEGATIVE
PH UR: 5 [PH] (ref 5–8)
PROT UR-MCNC: 30 MG/DL
SP GR UR STRIP: >1.03 (ref 1–1.03)
UROBILINOGEN UR STRIP-ACNC: 2
VIT C UR-MCNC: NEGATIVE MG/DL

## 2022-10-11 PROCEDURE — 3008F BODY MASS INDEX DOCD: CPT | Performed by: NURSE PRACTITIONER

## 2022-10-11 PROCEDURE — 99244 OFF/OP CNSLTJ NEW/EST MOD 40: CPT | Performed by: NURSE PRACTITIONER

## 2022-10-11 RX ORDER — DOXYCYCLINE HYCLATE 100 MG/1
100 CAPSULE ORAL 2 TIMES DAILY
Qty: 20 CAPSULE | Refills: 0 | Status: SHIPPED | OUTPATIENT
Start: 2022-10-11 | End: 2022-10-21

## 2022-10-12 DIAGNOSIS — E11.65 UNCONTROLLED TYPE 2 DIABETES MELLITUS WITH HYPERGLYCEMIA, WITHOUT LONG-TERM CURRENT USE OF INSULIN (HCC): ICD-10-CM

## 2022-10-12 DIAGNOSIS — E78.2 MIXED HYPERLIPIDEMIA: ICD-10-CM

## 2022-10-13 NOTE — TELEPHONE ENCOUNTER
Please review. Protocol failed / No protocol. Requested Prescriptions   Pending Prescriptions Disp Refills    LISINOPRIL 2.5 MG Oral Tab [Pharmacy Med Name: Lisinopril 2.5 MG Oral Tablet] 90 tablet 0     Sig: Take 1 tablet by mouth once daily        Hypertensive Medications Protocol Failed - 10/12/2022  4:12 PM        Failed - CMP or BMP in past 6 months     No results found for this or any previous visit (from the past 4392 hour(s)).               Passed - In person appointment in the past 12 or next 3 months       Recent Outpatient Visits              2 days ago Right testicular pain    [facility], [address], Cristhian Soler, APRN    Office Visit    2 months ago Pain in right testicle    [address] [facility], [facility], [address], [place], [place]    Office Visit    4 months ago Boeing of right testicle    [address] [facility], [facility], [address], [place], DO    Telemedicine    5 months ago Bilateral bunions    [address] [facility], [facility], [place], [place]    Office Visit    6 months ago Uncontrolled type 2 diabetes mellitus with hyperglycemia, without long-term current use of insulin [facility])    [address] [facility], [facility], [address], [place], DO    Office Visit                 Passed - Last BP reading less than 140/90     BP Readings from Last 1 Encounters:  07/29/22 : 124/81                Passed - In person appointment or virtual visit in the past 6 months       Recent Outpatient Visits              2 days ago Right testicular pain    [facility], [address], [email or web address], Cristhian Soler, APRN    Office Visit    2 months ago Pain in right testicle    [address] [facility], [facility], [address], [place], DO    Office Visit    4 months ago [employer] of right testicle    [address] [facility], [address], [place],     Telemedicine    5 months ago Bilateral bunions    [address] [facility], [facility], Seema Sanderson, DPM    Office Visit    6 months ago Uncontrolled type 2 diabetes mellitus with hyperglycemia, without long-term current use of insulin ([address]    [facility], [address], [address] [place], [place]    Office Visit                 Passed - EGFRCR or GFRNAA > 50     GFR Evaluation  GFRNAA: 115 , resulted on 3/19/2022               ATORVASTATIN 20 MG Oral Tab [Pharmacy Med Name: Atorvastatin Calcium 20 MG Oral Tablet] 90 tablet 0     Sig: TAKE 1 TABLET BY MOUTH ONCE DAILY AT NIGHT FOR CHOLESTEROL        Cholesterol Medication Protocol Failed - 10/12/2022  4:12 PM        Failed - ALT in past 12 months        Failed - LDL in past 12 months        Failed - Last ALT < 80       Lab Results   Component Value Date    ALT 34 05/08/2021             Failed - Last LDL < 130     Lab Results   Component Value Date    LDL 71 05/08/2021               Passed - In person appointment or virtual visit in the past 12 mos or appointment in next 3 mos       Recent Outpatient Visits              2 days ago Right testicular pain    [facility], [address], Cherene Kayser, APRN    Office Visit    2 months ago Pain in right testicle    [facility], [address], [address] [place] DO    Office Visit    4 months ago [employer] of right testicle    [facility], [address], [address], [place],     Telemedicine    5 months ago Bilateral bunions    [facility], [address], [address] [place] [address]    Office Visit    6 months ago Uncontrolled type 2 diabetes mellitus with hyperglycemia, without long-term current use of insulin ([address]    [facility], [address], [address], [place],     Office Visit                    PIOGLITAZONE 45 MG Oral Tab [Pharmacy Med Name: Pioglitazone HCl 45 MG Oral Tablet] 90 tablet 0     Sig: TAKE 1 TABLET BY MOUTH ONCE DAILY FOR DIABETES        Diabetes Medication Protocol Failed - 10/12/2022  4:12 PM        Failed - Last A1C < 7.5 and within past 6 months     Lab Results   Component Value Date    A1C 7.3 (H) 03/19/2022               Passed - In person appointment or virtual visit in the past 6 mos or appointment in next 3 mos       Recent Outpatient Visits              2 days ago Right testicular pain    [facility], [address], [place], Mallory Sanchez, APRN    Office Visit    2 months ago Pain in right testicle    [address] [facility], [address], [address], [place],     Office Visit    4 months ago Boeing of right testicle    [address], [address], [address], [place],     Telemedicine    5 months ago Bilateral bunions    [address], [address], Manjeet Abdi [facility], [place]    Office Visit    6 months ago Uncontrolled type 2 diabetes mellitus with hyperglycemia, without long-term current use of insulin ([facility] [address] [facility]    [address], [address], [address], [place], [place]    Office Visit                 Passed - EGFRCR or GFRNAA > 50     GFR Evaluation  GFRNAA: 115 , resulted on 3/19/2022            Passed - GFR in the past 12 months           XIGDUO XR 5-1000 MG Oral Tablet [address] [facility] [address] [Pharmacy Med Name: [address] [place] [address] XR 5-1000MG  TAB] 180 tablet 0     Sig: TAKE 2 TABLETS BY MOUTH ONCE DAILY  [facility].  (TO REPLACE INVOKAMET XR)        Diabetes Medication Protocol Failed - 10/12/2022  4:12 PM        Failed - Last A1C < 7.5 and within past 6 months     Lab Results   Component Value Date    A1C 7.3 (H) 03/19/2022               Passed - In person appointment or virtual visit in the past 6 mos or appointment in next 3 mos       Recent Outpatient Visits              2 days ago Right testicular pain    [facility], [address], [place], Mallory Sanchez, APRN    Office Visit    2 months ago Pain in right testicle    [address], [address], [address], [place], [place]    Office Visit    4 months ago Boeing of right testicle    [address], [address], [address], [place],  Telemedicine    5 months ago Bilateral bunions    [address], [address], [place], [place]    Office Visit    6 months ago Uncontrolled type 2 diabetes mellitus with hyperglycemia, without long-term current use of insulin [facility])    [facility], [facility], [address], [address], [place],     Office Visit                 Passed - EGFRCR or GFRNAA > 50     GFR Evaluation  GFRNAA: 115 , resulted on 3/19/2022            Passed - GFR in the past 12 months               Recent Outpatient Visits              2 days ago Right testicular pain    [facility], [place], [employer], STEPHANIE Lee    Office Visit    2 months ago Pain in right testicle    [facility], [facility], [address], [address], [place]    Office Visit    4 months ago [employer] of right testicle    [facility], [facility], [address], [address], [place],     Telemedicine    5 months ago Bilateral bunions    [facility], [address], Franklyn Fischer, [place], [place]    Office Visit    6 months ago Uncontrolled type 2 diabetes mellitus with hyperglycemia, without long-term current use of insulin [facility])    [facility], [address], [address], [place], [place]    Office Visit

## 2022-10-14 RX ORDER — PIOGLITAZONEHYDROCHLORIDE 45 MG/1
TABLET ORAL
Qty: 90 TABLET | Refills: 0 | Status: SHIPPED | OUTPATIENT
Start: 2022-10-14 | End: 2023-01-09

## 2022-10-14 RX ORDER — ATORVASTATIN CALCIUM 20 MG/1
TABLET, FILM COATED ORAL
Qty: 90 TABLET | Refills: 0 | Status: SHIPPED | OUTPATIENT
Start: 2022-10-14 | End: 2023-01-09

## 2022-10-14 RX ORDER — AMOXICILLIN 875 MG/1
875 TABLET, COATED ORAL 2 TIMES DAILY
Qty: 20 TABLET | Refills: 0 | Status: SHIPPED | OUTPATIENT
Start: 2022-10-14 | End: 2022-10-24

## 2022-10-14 RX ORDER — LISINOPRIL 2.5 MG/1
TABLET ORAL
Qty: 90 TABLET | Refills: 0 | Status: SHIPPED | OUTPATIENT
Start: 2022-10-14 | End: 2023-01-09

## 2022-10-14 RX ORDER — DAPAGLIFLOZIN AND METFORMIN HYDROCHLORIDE 5; 1000 MG/1; MG/1
TABLET, FILM COATED, EXTENDED RELEASE ORAL
Qty: 180 TABLET | Refills: 0 | Status: SHIPPED | OUTPATIENT
Start: 2022-10-14 | End: 2023-01-09

## 2022-10-14 NOTE — TELEPHONE ENCOUNTER
Refilled times 1 but needs appointment before the next prescription will be filled. Please call patient and set up an office visit as overdue for diabetic visit.

## 2022-12-11 NOTE — ED INITIAL ASSESSMENT (HPI)
Patient reports feeling severe anxiety for about 2 hours with chest tightness and inability to sit still. The patient states that he has been prescribed unknown anxiety medication on Saturday but has not been able to have the prescription filled. Chief Complaint: RLE swelling and redness, epigastric discomfort    Interval Hx: Patient seen and examined earlier today. No new complaints. No significant changes. Continues to have RLE swelling and redness, slightly improved compared to presentation. Continues to have RLE pain complaints, only partially relieved with hydromorphone 1mg IV q4h prn. He remains on empiric antibiotics. MRI negative for osteomyelitis. Workup ongoing to further evaluate the extent of his peripheral vascular disease in that extremity. He is planned for LE angiogram tomorrow. As for his epigastric discomfort, it is mild-to-moderate, being treated with Carafate and PPI. Anticipated that he will eventually need an upper endoscopy to evaluate that epigastric discomfort and also as routine appropriate testing in the setting of his cirrhosis.   12/06/22: RLE angio today. No new issues.  12/07/22: Patient seen and examined. Complaining of right LE pain. Discussed with patient regarding management plan.   12/8: Sitting in chair, comfortable, offers no complaints. Denies fever, chills, n, v.  12/09/22: Patient seen and examined. Nuclear stress test today  12/10/22: No new issues.   12/11/22: Sitting in chair, feels better. No new issues      ROS: Multi system review is comprehensively negative x 10 systems except as above    Vitals:        Vital Signs Last 24 Hrs  T(C): 36.4 (11 Dec 2022 09:58), Max: 36.7 (10 Dec 2022 15:35)  T(F): 97.6 (11 Dec 2022 09:58), Max: 98 (10 Dec 2022 15:35)  HR: 72 (11 Dec 2022 09:58) (72 - 81)  BP: 134/67 (11 Dec 2022 09:58) (134/57 - 141/62)  BP(mean): --  RR: 18 (11 Dec 2022 09:58) (17 - 18)  SpO2: 95% (11 Dec 2022 09:58) (94% - 95%)    Parameters below as of 11 Dec 2022 09:58  Patient On (Oxygen Delivery Method): room air          Exam:  Gen: No acute distress  HEENT: NCAT PERRL EOMI MMM  Neck: Supple no JVD no LAD  CVS: S1 S2 normal RRR  Chest: Normal resp effort, lungs CTA B/L  Abd: +BS, soft NT ND   Ext: L BKA. RLE with mild erythema from upper shin down to foot, small superficial wounds on shin, large open wound on plantar surface of foot at the foot pad.   Skin: As described in extremity exam. Some additional flaking skin on R foot  Neuro: A+OX3, no focal deficits, sensation intake to RLE  Mood: Calm, pleasant        Labs:                                                            9.3    15.81 )-----------( 384      ( 10 Dec 2022 08:58 )             29.3     09 Dec 2022 11:41    141    |  113    |  24     ----------------------------<  117    3.7     |  23     |  1.00     Ca    9.2        09 Dec 2022 11:41          CAPILLARY BLOOD GLUCOSE      POCT Blood Glucose.: 90 mg/dL (10 Dec 2022 11:09)  POCT Blood Glucose.: 105 mg/dL (10 Dec 2022 07:41)  POCT Blood Glucose.: 141 mg/dL (09 Dec 2022 19:53)  POCT Blood Glucose.: 144 mg/dL (09 Dec 2022 17:14)  POCT Blood Glucose.: 144 mg/dL (09 Dec 2022 17:06)  POCT Blood Glucose.: 115 mg/dL (09 Dec 2022 12:23)            Micro:  Bld cx x 2 12/1: Negative to date  COVID, Flu, RSV PCR 12/1: Negative    Imaging:  MRI RLE, R foot 12/2: There is no evidence for osteomyelitis.    RLE arterial duplex 12/1: No appreciable flow detected in the posterior tibial and peroneal arteries.    R foot XR 12/1: Soft tissue emphysema at the plantar aspect of the mid foot/calcaneous.     Cardiac Testing:  TTE 12/2:  Endocardium is not well visualized, however, overall left ventricular systolic function appears normal. Mild concentric left ventricular hypertrophy. Septal flattening is seen; this finding is consistent with right heart pressure / volume overload. Estimated left ventricular ejection fraction is 55-60%. The right ventricle exhibits mild dilation, mild diffuse hypokinesis, and mild depression of contractility. Severe mitral stenosis. Mild mitral regurgitation. Mild to moderate tricuspid valve regurgitation. Severe pulmonary hypertension.    Meds:  MEDICATIONS  (STANDING):  amLODIPine   Tablet 5 milliGRAM(s) Oral daily  aspirin  chewable 81 milliGRAM(s) Oral daily  dextrose 5%. 1000 milliLiter(s) (100 mL/Hr) IV Continuous <Continuous>  dextrose 5%. 1000 milliLiter(s) (50 mL/Hr) IV Continuous <Continuous>  dextrose 50% Injectable 25 Gram(s) IV Push once  dextrose 50% Injectable 12.5 Gram(s) IV Push once  dextrose 50% Injectable 25 Gram(s) IV Push once  doxycycline monohydrate Capsule 100 milliGRAM(s) Oral every 12 hours  enoxaparin Injectable 40 milliGRAM(s) SubCutaneous every 24 hours  famotidine    Tablet 40 milliGRAM(s) Oral at bedtime  furosemide    Tablet 40 milliGRAM(s) Oral daily  gabapentin 300 milliGRAM(s) Oral two times a day  gabapentin 600 milliGRAM(s) Oral at bedtime  glucagon  Injectable 1 milliGRAM(s) IntraMuscular once  insulin glargine Injectable (LANTUS) 30 Unit(s) SubCutaneous at bedtime  insulin lispro (ADMELOG) corrective regimen sliding scale   SubCutaneous three times a day before meals  isosorbide   mononitrate ER Tablet (IMDUR) 30 milliGRAM(s) Oral daily  lisinopril 5 milliGRAM(s) Oral daily  metoclopramide 10 milliGRAM(s) Oral daily  multivitamin 1 Tablet(s) Oral daily  pantoprazole    Tablet 40 milliGRAM(s) Oral before breakfast  piperacillin/tazobactam IVPB.. 3.375 Gram(s) IV Intermittent every 8 hours  polyethylene glycol 3350 17 Gram(s) Oral daily  senna 2 Tablet(s) Oral at bedtime  simvastatin 10 milliGRAM(s) Oral at bedtime  sucralfate suspension 1 Gram(s) Oral four times a day  tamsulosin 0.8 milliGRAM(s) Oral at bedtime    MEDICATIONS  (PRN):  acetaminophen     Tablet .. 650 milliGRAM(s) Oral every 6 hours PRN Temp greater or equal to 38C (100.4F), Mild Pain (1 - 3)  aluminum hydroxide/magnesium hydroxide/simethicone Suspension 30 milliLiter(s) Oral every 4 hours PRN Dyspepsia  dextrose Oral Gel 15 Gram(s) Oral once PRN Blood Glucose LESS THAN 70 milliGRAM(s)/deciliter  HYDROmorphone  Injectable 1 milliGRAM(s) IV Push every 4 hours PRN Moderate Pain (4 - 6)  HYDROmorphone  Injectable 1.5 milliGRAM(s) IV Push every 4 hours PRN Severe Pain (7 - 10)  magnesium hydroxide Suspension 30 milliLiter(s) Oral every 8 hours PRN Constipation  melatonin 3 milliGRAM(s) Oral at bedtime PRN Insomnia  ondansetron Injectable 4 milliGRAM(s) IV Push every 8 hours PRN Nausea and/or Vomiting          Assessment and Plan:  64 yo man with DM, HTN, CAD, 3v CABG, hx of PAD, L BKA, GERD, past alcohol use disorder, cirrhosis, chronic anemia, sent from [place] for worsening RLE swelling and redness, also complaints of epigastric discomfort. In the ED, exam was consistent with cellulitis of the RLE, non healing wound on the plantar surface of the R foot. Elevated inflammatory markers. Patient was placed on antibiotics and admitted to Medicine.     RLE skin and soft tissue infection in setting of non healing R foot ulcer due to/as a consequence of  type 2 DM and PAD:  -MRI RLE negative for osteomyelitis.   -S/P RLE angio, per vascular --> would benefit from distal femoral bypass surgery of extremity once cleared by cardiology. Will discuss with them regarding clearance   -Continue on empiric antibiotics per ID with zosyn, and doxycycline   -Continue aspirin, statin      CAD /  Hx of CABG / demand ischemia /  severe mitral stenosis / severe pulmonary HTN / HTN:  -echo: EF: 55-60%, severe Mitral stenosis, severe pulmonary HTN  -chest xray 12/7: vascular congestion  -stop IVFs  -c/w lasix  -monitor I/O  -Continue aspirin, statin, Imdur  -Continue amlodipine, lisinopril and Imdur  -cardiac work up ongoing by cardiology: nuclear stress test today, no ischemia  +/- right and left heart cath/ALEX-will discuss with cardiology regarding clearance  for bypass  -Follow Dr. Wilson recommendation.      Epigastric discomfort / dyspepsia:  -Per GI --> Carafate,  PPI.   -Eventual EGD, treat empirically for now         DM2:  -A1c 7.  - Continue Lantus and Lispro  - Monitor glucose TID AC and HS    BPH: Stable  - Continue Flomax      Code Status: Full  DVT px: LMWH  Dispo: To be determined pending further clinical assessment

## 2023-01-19 ENCOUNTER — PATIENT MESSAGE (OUTPATIENT)
Dept: FAMILY MEDICINE CLINIC | Facility: CLINIC | Age: 43
End: 2023-01-19

## 2023-01-19 DIAGNOSIS — Z00.00 ADULT GENERAL MEDICAL EXAM: Primary | ICD-10-CM

## 2023-01-19 DIAGNOSIS — E11.65 UNCONTROLLED TYPE 2 DIABETES MELLITUS WITH HYPERGLYCEMIA, WITHOUT LONG-TERM CURRENT USE OF INSULIN (HCC): ICD-10-CM

## 2023-01-19 NOTE — TELEPHONE ENCOUNTER
From: Christian Manuel  To:  Caitlin Bal DO  Sent: 1/19/2023 9:48 AM CST  Subject: Blood work    Can you put in my blood work that way I can do my blood work before my Feb 3appt with you

## 2023-01-28 ENCOUNTER — LAB ENCOUNTER (OUTPATIENT)
Dept: LAB | Age: 43
End: 2023-01-28
Attending: FAMILY MEDICINE
Payer: COMMERCIAL

## 2023-01-28 DIAGNOSIS — Z00.00 ADULT GENERAL MEDICAL EXAM: ICD-10-CM

## 2023-01-28 DIAGNOSIS — E11.65 UNCONTROLLED TYPE 2 DIABETES MELLITUS WITH HYPERGLYCEMIA, WITHOUT LONG-TERM CURRENT USE OF INSULIN (HCC): ICD-10-CM

## 2023-01-28 LAB
ALBUMIN SERPL-MCNC: 4.1 G/DL (ref 3.4–5)
ALBUMIN/GLOB SERPL: 1.1 {RATIO} (ref 1–2)
ALP LIVER SERPL-CCNC: 106 U/L
ALT SERPL-CCNC: 57 U/L
ANION GAP SERPL CALC-SCNC: 9 MMOL/L (ref 0–18)
AST SERPL-CCNC: 33 U/L (ref 15–37)
BASOPHILS # BLD AUTO: 0.03 X10(3) UL (ref 0–0.2)
BASOPHILS NFR BLD AUTO: 0.4 %
BILIRUB SERPL-MCNC: 1 MG/DL (ref 0.1–2)
BUN BLD-MCNC: 15 MG/DL (ref 7–18)
BUN/CREAT SERPL: 23.8 (ref 10–20)
CALCIUM BLD-MCNC: 9.6 MG/DL (ref 8.5–10.1)
CHLORIDE SERPL-SCNC: 101 MMOL/L (ref 98–112)
CHOLEST SERPL-MCNC: 100 MG/DL (ref ?–200)
CO2 SERPL-SCNC: 24 MMOL/L (ref 21–32)
CREAT BLD-MCNC: 0.63 MG/DL
CREAT UR-SCNC: 72.2 MG/DL
DEPRECATED RDW RBC AUTO: 40.3 FL (ref 35.1–46.3)
EOSINOPHIL # BLD AUTO: 0.12 X10(3) UL (ref 0–0.7)
EOSINOPHIL NFR BLD AUTO: 1.5 %
ERYTHROCYTE [DISTWIDTH] IN BLOOD BY AUTOMATED COUNT: 11.8 % (ref 11–15)
EST. AVERAGE GLUCOSE BLD GHB EST-MCNC: 212 MG/DL (ref 68–126)
FASTING PATIENT LIPID ANSWER: YES
FASTING STATUS PATIENT QL REPORTED: YES
GFR SERPLBLD BASED ON 1.73 SQ M-ARVRAT: 123 ML/MIN/1.73M2 (ref 60–?)
GLOBULIN PLAS-MCNC: 3.7 G/DL (ref 2.8–4.4)
GLUCOSE BLD-MCNC: 172 MG/DL (ref 70–99)
HBA1C MFR BLD: 9 % (ref ?–5.7)
HCT VFR BLD AUTO: 47.2 %
HDLC SERPL-MCNC: 38 MG/DL (ref 40–59)
HGB BLD-MCNC: 15.4 G/DL
IMM GRANULOCYTES # BLD AUTO: 0.02 X10(3) UL (ref 0–1)
IMM GRANULOCYTES NFR BLD: 0.2 %
LDLC SERPL CALC-MCNC: 44 MG/DL (ref ?–100)
LYMPHOCYTES # BLD AUTO: 2.24 X10(3) UL (ref 1–4)
LYMPHOCYTES NFR BLD AUTO: 27.9 %
MCH RBC QN AUTO: 30.6 PG (ref 26–34)
MCHC RBC AUTO-ENTMCNC: 32.6 G/DL (ref 31–37)
MCV RBC AUTO: 93.7 FL
MICROALBUMIN UR-MCNC: 3.16 MG/DL
MICROALBUMIN/CREAT 24H UR-RTO: 43.8 UG/MG (ref ?–30)
MONOCYTES # BLD AUTO: 0.66 X10(3) UL (ref 0.1–1)
MONOCYTES NFR BLD AUTO: 8.2 %
NEUTROPHILS # BLD AUTO: 4.95 X10 (3) UL (ref 1.5–7.7)
NEUTROPHILS # BLD AUTO: 4.95 X10(3) UL (ref 1.5–7.7)
NEUTROPHILS NFR BLD AUTO: 61.8 %
NONHDLC SERPL-MCNC: 62 MG/DL (ref ?–130)
OSMOLALITY SERPL CALC.SUM OF ELEC: 283 MOSM/KG (ref 275–295)
PLATELET # BLD AUTO: 147 10(3)UL (ref 150–450)
POTASSIUM SERPL-SCNC: 4 MMOL/L (ref 3.5–5.1)
PROT SERPL-MCNC: 7.8 G/DL (ref 6.4–8.2)
RBC # BLD AUTO: 5.04 X10(6)UL
SODIUM SERPL-SCNC: 134 MMOL/L (ref 136–145)
TRIGL SERPL-MCNC: 90 MG/DL (ref 30–149)
TSI SER-ACNC: 1.28 MIU/ML (ref 0.36–3.74)
VLDLC SERPL CALC-MCNC: 13 MG/DL (ref 0–30)
WBC # BLD AUTO: 8 X10(3) UL (ref 4–11)

## 2023-01-28 PROCEDURE — 80061 LIPID PANEL: CPT

## 2023-01-28 PROCEDURE — 80053 COMPREHEN METABOLIC PANEL: CPT

## 2023-01-28 PROCEDURE — 84443 ASSAY THYROID STIM HORMONE: CPT

## 2023-01-28 PROCEDURE — 82043 UR ALBUMIN QUANTITATIVE: CPT

## 2023-01-28 PROCEDURE — 82570 ASSAY OF URINE CREATININE: CPT

## 2023-01-28 PROCEDURE — 3052F HG A1C>EQUAL 8.0%<EQUAL 9.0%: CPT | Performed by: FAMILY MEDICINE

## 2023-01-28 PROCEDURE — 85025 COMPLETE CBC W/AUTO DIFF WBC: CPT

## 2023-01-28 PROCEDURE — 3060F POS MICROALBUMINURIA REV: CPT | Performed by: FAMILY MEDICINE

## 2023-01-28 PROCEDURE — 83036 HEMOGLOBIN GLYCOSYLATED A1C: CPT

## 2023-01-28 PROCEDURE — 3061F NEG MICROALBUMINURIA REV: CPT | Performed by: FAMILY MEDICINE

## 2023-01-28 PROCEDURE — 36415 COLL VENOUS BLD VENIPUNCTURE: CPT

## 2023-02-03 ENCOUNTER — TELEPHONE (OUTPATIENT)
Dept: FAMILY MEDICINE CLINIC | Facility: CLINIC | Age: 43
End: 2023-02-03

## 2023-02-03 ENCOUNTER — LAB ENCOUNTER (OUTPATIENT)
Dept: LAB | Age: 43
End: 2023-02-03
Attending: FAMILY MEDICINE
Payer: COMMERCIAL

## 2023-02-03 ENCOUNTER — HOSPITAL ENCOUNTER (OUTPATIENT)
Dept: GENERAL RADIOLOGY | Age: 43
Discharge: HOME | End: 2023-02-03
Attending: FAMILY MEDICINE
Payer: COMMERCIAL

## 2023-02-03 ENCOUNTER — OFFICE VISIT (OUTPATIENT)
Dept: FAMILY MEDICINE CLINIC | Facility: CLINIC | Age: 43
End: 2023-02-03

## 2023-02-03 VITALS
HEIGHT: 70 IN | SYSTOLIC BLOOD PRESSURE: 126 MMHG | DIASTOLIC BLOOD PRESSURE: 83 MMHG | BODY MASS INDEX: 37.51 KG/M2 | HEART RATE: 86 BPM | TEMPERATURE: 97 F | WEIGHT: 262 LBS

## 2023-02-03 DIAGNOSIS — E11.65 UNCONTROLLED TYPE 2 DIABETES MELLITUS WITH HYPERGLYCEMIA, WITHOUT LONG-TERM CURRENT USE OF INSULIN (HCC): ICD-10-CM

## 2023-02-03 DIAGNOSIS — R68.82 DECREASED LIBIDO: Primary | ICD-10-CM

## 2023-02-03 DIAGNOSIS — M79.651 RIGHT THIGH PAIN: ICD-10-CM

## 2023-02-03 DIAGNOSIS — E78.2 MIXED HYPERLIPIDEMIA: ICD-10-CM

## 2023-02-03 DIAGNOSIS — R53.83 LETHARGY: ICD-10-CM

## 2023-02-03 DIAGNOSIS — R10.31 RIGHT GROIN PAIN: ICD-10-CM

## 2023-02-03 DIAGNOSIS — R68.82 DECREASED LIBIDO: ICD-10-CM

## 2023-02-03 PROBLEM — E66.01 MORBID (SEVERE) OBESITY DUE TO EXCESS CALORIES (HCC): Status: ACTIVE | Noted: 2023-02-03

## 2023-02-03 LAB — PROLACTIN SERPL-MCNC: 7.1 NG/ML

## 2023-02-03 PROCEDURE — 84403 ASSAY OF TOTAL TESTOSTERONE: CPT

## 2023-02-03 PROCEDURE — 36415 COLL VENOUS BLD VENIPUNCTURE: CPT

## 2023-02-03 PROCEDURE — 3079F DIAST BP 80-89 MM HG: CPT | Performed by: FAMILY MEDICINE

## 2023-02-03 PROCEDURE — 99215 OFFICE O/P EST HI 40 MIN: CPT | Performed by: FAMILY MEDICINE

## 2023-02-03 PROCEDURE — 3074F SYST BP LT 130 MM HG: CPT | Performed by: FAMILY MEDICINE

## 2023-02-03 PROCEDURE — 84402 ASSAY OF FREE TESTOSTERONE: CPT

## 2023-02-03 PROCEDURE — 3008F BODY MASS INDEX DOCD: CPT | Performed by: FAMILY MEDICINE

## 2023-02-03 PROCEDURE — 84146 ASSAY OF PROLACTIN: CPT

## 2023-02-03 PROCEDURE — 73502 X-RAY EXAM HIP UNI 2-3 VIEWS: CPT | Performed by: FAMILY MEDICINE

## 2023-02-03 RX ORDER — SEMAGLUTIDE 1.34 MG/ML
0.25 INJECTION, SOLUTION SUBCUTANEOUS
Qty: 4 EACH | Refills: 0 | Status: SHIPPED | OUTPATIENT
Start: 2023-02-03 | End: 2023-03-03

## 2023-02-03 RX ORDER — PEN NEEDLE, DIABETIC 30 GX3/16"
1 NEEDLE, DISPOSABLE MISCELLANEOUS
Qty: 30 EACH | Refills: 0 | Status: SHIPPED | OUTPATIENT
Start: 2023-02-03

## 2023-02-03 NOTE — PATIENT INSTRUCTIONS
If you are tolerating the Ozempic in 1 month please let me know through a nurse or through [address] so I can send in the higher dose of 0.5 mg/week.

## 2023-02-09 LAB
TESTOSTERONE, FREE, S: 7.15 NG/DL
TESTOSTERONE, TOTAL, S: 264 NG/DL

## 2023-04-12 DIAGNOSIS — E11.65 UNCONTROLLED TYPE 2 DIABETES MELLITUS WITH HYPERGLYCEMIA, WITHOUT LONG-TERM CURRENT USE OF INSULIN (HCC): ICD-10-CM

## 2023-04-12 DIAGNOSIS — M79.18 RIGHT BUTTOCK PAIN: ICD-10-CM

## 2023-04-12 DIAGNOSIS — N50.811 PAIN IN RIGHT TESTICLE: ICD-10-CM

## 2023-04-12 DIAGNOSIS — E78.2 MIXED HYPERLIPIDEMIA: ICD-10-CM

## 2023-04-13 RX ORDER — PIOGLITAZONEHYDROCHLORIDE 45 MG/1
TABLET ORAL
Qty: 90 TABLET | Refills: 3 | Status: SHIPPED | OUTPATIENT
Start: 2023-04-13

## 2023-04-13 RX ORDER — ATORVASTATIN CALCIUM 20 MG/1
TABLET, FILM COATED ORAL
Qty: 90 TABLET | Refills: 3 | Status: SHIPPED | OUTPATIENT
Start: 2023-04-13

## 2023-04-13 RX ORDER — DAPAGLIFLOZIN AND METFORMIN HYDROCHLORIDE 5; 1000 MG/1; MG/1
2 TABLET, FILM COATED, EXTENDED RELEASE ORAL DAILY
Qty: 180 TABLET | Refills: 3 | Status: SHIPPED | OUTPATIENT
Start: 2023-04-13

## 2023-04-13 RX ORDER — CELECOXIB 200 MG/1
200 CAPSULE ORAL
Qty: 90 CAPSULE | Refills: 1 | Status: SHIPPED | OUTPATIENT
Start: 2023-04-13

## 2023-04-13 RX ORDER — LISINOPRIL 2.5 MG/1
TABLET ORAL
Qty: 90 TABLET | Refills: 3 | Status: SHIPPED | OUTPATIENT
Start: 2023-04-13

## 2023-04-13 NOTE — TELEPHONE ENCOUNTER
Please review; protocol failed/ no protocol    Requested Prescriptions   Pending Prescriptions Disp Refills    PIOGLITAZONE 45 MG Oral Tab [Pharmacy Med Name: Pioglitazone HCl 45 MG Oral Tablet] 90 tablet 0     Sig: TAKE 1 TABLET BY MOUTH ONCE DAILY FOR DIABETES       Diabetes Medication Protocol Failed - 4/12/2023  6:44 PM        Failed - Last A1C < 7.5 and within past 6 months     Lab Results   Component Value Date    A1C 9.0 (H) 01/28/2023               Passed - In person appointment or virtual visit in the past 6 mos or appointment in next 3 mos     Recent Outpatient Visits              2 months ago Decreased libido    [address], [address], [address], [place], [place]    Office Visit    6 months ago Right testicular pain    Aman Arias, [email or web address], [address], APRN    Office Visit    8 months ago Pain in right testicle    [address], [address], [address], [place], [place]    Office Visit    10 months ago Boeing of right testicle    Aman Arias, Franklyn Lim, [place], [place]    Telemedicine    11 months ago Bilateral bunions    [address], [address], Manjeet Abdih, DPM    Office Visit                      Passed - EGFRCR or GFRNAA > 50     GFR Evaluation  EGFRCR: 123 , resulted on 1/28/2023            Passed - GFR in the past 12 months          XIGDUO XR 5-1000 MG Oral Tablet 24 Hr [Pharmacy Med Name: Kerrie Mince XR 5-1000 MG Oral Tablet Extended Release 24 Hour] 180 tablet 0     Sig: TAKE 2 TABLETS BY MOUTH ONCE DAILY FOR DIABETES       Diabetes Medication Protocol Failed - 4/12/2023  6:44 PM        Failed - Last A1C < 7.5 and within past 6 months     Lab Results   Component Value Date    A1C 9.0 (H) 01/28/2023               Passed - In person appointment or virtual visit in the past 6 mos or appointment in next 3 mos     Recent Outpatient Visits              2 months ago Decreased libido    [address] [facility] [address], [address], [place], [place]    Office Visit    6 months ago Right testicular pain    [facility], [address], [employer], [facility], APRN    Office Visit    8 months ago Pain in right testicle    [facility], [address], [address], [place], [place]    Office Visit    10 months ago Boeing of right testicle    [address] [facility], [facility], [place]    Telemedicine    11 months ago Bilateral bunions    [address], [address], Kalin Abdi, [facility]    Office Visit                      Passed - EGFRCR or GFRNAA > 50     GFR Evaluation  EGFRCR: 123 , resulted on 1/28/2023            Passed - GFR in the past 12 months         Signed Prescriptions Disp Refills    celecoxib 200 MG Oral Cap 90 capsule 1     Sig: Take 1 capsule (200 mg total) by mouth daily with food.        Non-Narcotic Pain Medication Protocol Passed - 4/12/2023  6:44 PM        Passed - In person appointment or virtual visit in the past 6 mos or appointment in next 3 mos     Recent Outpatient Visits              2 months ago Decreased libido    [address], [address], [address], [place], [place]    Office Visit    6 months ago Right testicular pain    [address] [facility] [address], [employer], [facility], APRN    Office Visit    8 months ago Pain in right testicle    [address], [address], [address], [place], [place]    Office Visit    10 months ago Boeing of right testicle    [address] [facility] [address], [facility], [facility]    Telemedicine    11 months ago Bilateral bunions    [address] [facility], Kalin Abdi, [place]    Office Visit                        lisinopril 2.5 MG Oral Tab 90 tablet 3     Sig: Take 1 tablet by mouth once daily       Hypertensive Medications Protocol Passed - 4/12/2023  6:44 PM        Passed - In person appointment in the past 12 or next 3 months     Recent Outpatient Visits              2 months ago Decreased libido    [address], [address], [address], [place], [place]    Office Visit    6 months ago Right testicular pain    [address], [employer], [address], APRN    Office Visit    8 months ago Pain in right testicle    [address], [address], [address], [place], [place]    Office Visit    10 months ago Boeing of right testicle    [address] [facility], Franklyn Lim, [place], [place]    Telemedicine    11 months ago Bilateral bunions    [address], [address], Michael Abdi, [place]    Office Visit                      Passed - Last BP reading less than 140/90     BP Readings from Last 1 Encounters:  02/03/23 : 126/83                Passed - CMP or BMP in past 6 months     Recent Results (from the past 4392 hour(s))   COMP METABOLIC PANEL (14)    Collection Time: 01/28/23  9:38 AM   Result Value Ref Range    Glucose 172 (H) 70 - 99 mg/dL    Sodium 134 (L) 136 - 145 mmol/L    Potassium 4.0 3.5 - 5.1 mmol/L    Chloride 101 98 - 112 mmol/L    CO2 24.0 21.0 - 32.0 mmol/L    Anion Gap 9 0 - 18 mmol/L    BUN 15 7 - 18 mg/dL    Creatinine 0.63 (L) 0.70 - 1.30 mg/dL    BUN/CREA Ratio 23.8 (H) 10.0 - 20.0    Calcium, Total 9.6 8.5 - 10.1 mg/dL    Calculated Osmolality 283 275 - 295 mOsm/kg    eGFR-Cr 123 >=60 mL/min/1.73m2    ALT 57 16 - 61 U/L    AST 33 15 - 37 U/L    Alkaline Phosphatase 106 45 - 117 U/L    Bilirubin, Total 1.0 0.1 - 2.0 mg/dL    Total Protein 7.8 6.4 - 8.2 g/dL    Albumin 4.1 3.4 - 5.0 g/dL    Globulin  3.7 2.8 - 4.4 g/dL    A/G Ratio 1.1 1.0 - 2.0    Patient Fasting for CMP? Yes      *Note: Due to a large number of results and/or encounters for the requested time period, some results have not been displayed.  A complete set of results can be found in Results Review.                  Passed - In person appointment or virtual visit in the past 6 months     Recent Outpatient Visits              2 months ago [facility]ido    [facility], [facility], [address], [place], [place]    Office Visit    6 months ago Right testicular pain    [address], [employer], [address], APRN    Office Visit    8 months ago Pain in right testicle    [address], [facility], [address], [place], [place]    Office Visit    10 months ago Boeing of right testicle    [address], [facility], [place],     Telemedicine    11 months ago Bilateral bunions    [address], [facility], [place], [place]    Office Visit                      Passed - EGFRCR or GFRNAA > 50     GFR Evaluation  EGFRCR: 123 , resulted on 1/28/2023              atorvastatin 20 MG Oral Tab 90 tablet 3     Sig: TAKE 1 TABLET BY MOUTH ONCE DAILY AT NIGHT FOR CHOLESTEROL       Cholesterol Medication Protocol Passed - 4/12/2023  6:44 PM        Passed - ALT in past 12 months        Passed - LDL in past 12 months        Passed - Last ALT < 80     Lab Results   Component Value Date    ALT 57 01/28/2023             Passed - Last LDL < 130     Lab Results   Component Value Date    LDL 44 01/28/2023               Passed - In person appointment or virtual visit in the past 12 mos or appointment in next 3 mos     Recent Outpatient Visits              2 months ago Decreased libido    [address], [facility], [address], [place], [place]    Office Visit    6 months ago Right testicular pain    [address], [employer], [address], APRN    Office Visit    8 months ago Pain in right testicle    [address], [facility], [address], [place], [place]    Office Visit    10 months ago Mass of right testicle [address] [facility] [address], Amilcar Carbajal,     Telemedicine    11 months ago Bilateral bunions    [address] [facility], Juli Abdi, [place]    Office Visit

## 2023-04-13 NOTE — TELEPHONE ENCOUNTER
Refill passed per [facility] protocol for Celecoxib, Lisinopril, and Atorvastatin    Requested Prescriptions   Pending Prescriptions Disp Refills    CELECOXIB 200 MG Oral Cap [Pharmacy Med Name: Celecoxib 200 MG Oral Capsule] 90 capsule 0     Sig: TAKE 1 CAPSULE BY MOUTH ONCE DAILY WITH FOOD FOR PAIN AND FOR INFLAMMATION       Non-Narcotic Pain Medication Protocol Passed - 4/12/2023  6:44 PM        Passed - In person appointment or virtual visit in the past 6 mos or appointment in next 3 mos     Recent Outpatient Visits              2 months ago Decreased libido    [address], [address], [place], [place]    Office Visit    6 months ago Right testicular pain    [address], [address], APRN    Office Visit    8 months ago Pain in right testicle    [address], [address], [place], [place]    Office Visit    10 months ago Boeing of right testicle    [address] [facility], [facility],     Telemedicine    11 months ago Bilateral bunions    [address], Maximiliano Abdi, [place]    Office Visit                        LISINOPRIL 2.5 MG Oral Tab [Pharmacy Med Name: Lisinopril 2.5 MG Oral Tablet] 90 tablet 0     Sig: Take 1 tablet by mouth once daily       Hypertensive Medications Protocol Passed - 4/12/2023  6:44 PM        Passed - In person appointment in the past 12 or next 3 months     Recent Outpatient Visits              2 months ago Decreased libido    [address], [address], [place], [place]    Office Visit    6 months ago Right testicular pain    [address], [employer], [address], APRN    Office Visit    8 months ago Pain in right testicle    [address], [address], [place], [place]    Office Visit    10 months ago Mass of right testicle    [facility], [address], Franklyn Lim, [place], [place]    Telemedicine    11 months ago Bilateral bunions    [facility], [facility], Araceli Abdi, [place]    Office Visit                      Passed - Last BP reading less than 140/90     BP Readings from Last 1 Encounters:  02/03/23 : 126/83                Passed - CMP or BMP in past 6 months     Recent Results (from the past 4392 hour(s))   COMP METABOLIC PANEL (14)    Collection Time: 01/28/23  9:38 AM   Result Value Ref Range    Glucose 172 (H) 70 - 99 mg/dL    Sodium 134 (L) 136 - 145 mmol/L    Potassium 4.0 3.5 - 5.1 mmol/L    Chloride 101 98 - 112 mmol/L    CO2 24.0 21.0 - 32.0 mmol/L    Anion Gap 9 0 - 18 mmol/L    BUN 15 7 - 18 mg/dL    Creatinine 0.63 (L) 0.70 - 1.30 mg/dL    BUN/CREA Ratio 23.8 (H) 10.0 - 20.0    Calcium, Total 9.6 8.5 - 10.1 mg/dL    Calculated Osmolality 283 275 - 295 mOsm/kg    eGFR-Cr 123 >=60 mL/min/1.73m2    ALT 57 16 - 61 U/L    AST 33 15 - 37 U/L    Alkaline Phosphatase 106 45 - 117 U/L    Bilirubin, Total 1.0 0.1 - 2.0 mg/dL    Total Protein 7.8 6.4 - 8.2 g/dL    Albumin 4.1 3.4 - 5.0 g/dL    Globulin  3.7 2.8 - 4.4 g/dL    A/G Ratio 1.1 1.0 - 2.0    Patient Fasting for CMP? Yes      *Note: Due to a large number of results and/or encounters for the requested time period, some results have not been displayed. A complete set of results can be found in Results Review.                  Passed - In person appointment or virtual visit in the past 6 months     Recent Outpatient Visits              2 months ago Decreased libido    [address], [address], [place], [place]    Office Visit    6 months ago Right testicular pain    [address] [employer], [employer], [address], APRN    Office Visit    8 months ago Pain in right testicle    [address], [address], [address], [place], [place]    Office Visit    10 months ago Mass of right testicle    [facility], [address], [facility]    Telemedicine    11 months ago Bilateral bunions    [address], Yolis Abdi, [place]    Office Visit                      Passed - EGFRCR or GFRNAA > 50     GFR Evaluation  EGFRCR: 123 , resulted on 1/28/2023              PIOGLITAZONE 45 MG Oral Tab [Pharmacy Med Name: Pioglitazone HCl 45 MG Oral Tablet] 90 tablet 0     Sig: TAKE 1 TABLET BY MOUTH ONCE DAILY FOR DIABETES       Diabetes Medication Protocol Failed - 4/12/2023  6:44 PM        Failed - Last A1C < 7.5 and within past 6 months     Lab Results   Component Value Date    A1C 9.0 (H) 01/28/2023               Passed - In person appointment or virtual visit in the past 6 mos or appointment in next 3 mos     Recent Outpatient Visits              2 months ago Decreased libido    [address], [address], [place],     Office Visit    6 months ago Right testicular pain    Mary Jane Hernández, [employer], [address], APRN    Office Visit    8 months ago Pain in right testicle    [address], [address], [place],     Office Visit    10 months ago Boeing of right testicle    [address], [facility]    Telemedicine    11 months ago Bilateral nidaions    [address], Yolis Abdi, [facility]    Office Visit                      Passed - EGFRCR or GFRNAA > 50     GFR Evaluation  EGFRCR: 123 , resulted on 1/28/2023            Passed - GFR in the past 12 months          ATORVASTATIN 20 MG Oral Tab [Pharmacy Med Name: Atorvastatin Calcium 20 MG Oral Tablet] 90 tablet 0     Sig: TAKE 1 TABLET BY MOUTH ONCE DAILY AT NIGHT FOR CHOLESTEROL       Cholesterol Medication Protocol Passed - 4/12/2023  6:44 PM        Passed - ALT in past 12 months Passed - LDL in past 12 months        Passed - Last ALT < 80     Lab Results   Component Value Date    ALT 57 01/28/2023             Passed - Last LDL < 130     Lab Results   Component Value Date    LDL 44 01/28/2023               Passed - In person appointment or virtual visit in the past 12 mos or appointment in next 3 mos     Recent Outpatient Visits              2 months ago Decreased libido    [address], [address], [address], [place], [place]    Office Visit    6 months ago Right testicular pain    Devorah Danube, [employer], [address], APRN    Office Visit    8 months ago Pain in right testicle    [address], [address], [address], [place], [place]    Office Visit    10 months ago Boeing of right testicle    [address] [place],[address], [address], Franklyn Lim, [place], [place]    Telemedicine    11 months ago Bilateral bunions    [address], [address], [place] Abdi, DPM    Office Visit                        XIGDUO XR 5-1000 MG Oral Tablet 24 [facility] 7045 [Pharmacy Med Name: Xigduo XR 5-1000 MG Oral Tablet Extended Release 24 Hour] 180 tablet 0     Sig: TAKE 2 TABLETS BY MOUTH ONCE DAILY FOR DIABETES       Diabetes Medication Protocol Failed - 4/12/2023  6:44 PM        Failed - Last A1C < 7.5 and within past 6 months     Lab Results   Component Value Date    A1C 9.0 (H) 01/28/2023               Passed - In person appointment or virtual visit in the past 6 mos or appointment in next 3 mos     Recent Outpatient Visits              2 months ago Decreased libido    [address], [address], [address], [place], [place]    Office Visit    6 months ago Right testicular pain    Devor[address]ube, [employer], [address], APRN    Office Visit    8 months ago Pain in right testicle    [address], [address], [address], [place], [place]    Office Visit 10 months ago Mass of right testicle    [facility], [facility], Frank[address] Lim, [place], [place]    Telemedicine    11 months ago Bilateral bunions    [address], [place] [address], Maximiliano Abdi, [place]    Office Visit                      Passed - EGFRCR or GFRNAA > 50     GFR Evaluation  EGFRCR: 123 , resulted on 1/28/2023            Passed - GFR in the past 12 months

## 2023-06-06 ENCOUNTER — TELEPHONE (OUTPATIENT)
Dept: FAMILY MEDICINE CLINIC | Facility: CLINIC | Age: 43
End: 2023-06-06

## 2024-02-20 ENCOUNTER — TELEPHONE (OUTPATIENT)
Dept: FAMILY MEDICINE CLINIC | Facility: CLINIC | Age: 44
End: 2024-02-20

## 2024-02-20 DIAGNOSIS — E11.65 UNCONTROLLED TYPE 2 DIABETES MELLITUS WITH HYPERGLYCEMIA, WITHOUT LONG-TERM CURRENT USE OF INSULIN (HCC): ICD-10-CM

## 2024-02-20 DIAGNOSIS — R68.82 DECREASED LIBIDO: ICD-10-CM

## 2024-02-20 DIAGNOSIS — Z00.00 ADULT GENERAL MEDICAL EXAM: Primary | ICD-10-CM

## 2024-02-20 NOTE — TELEPHONE ENCOUNTER
Patient will like to schedule an appointment for his px but is asking for labs prior to scheduling appointment. Please advise.

## 2024-02-20 NOTE — TELEPHONE ENCOUNTER
Contacted patient and informed labs have been ordered,he verbalized understanding and states will call back to schedule appointment.

## 2024-03-02 ENCOUNTER — LAB ENCOUNTER (OUTPATIENT)
Dept: LAB | Age: 44
End: 2024-03-02
Attending: FAMILY MEDICINE
Payer: COMMERCIAL

## 2024-03-02 DIAGNOSIS — Z00.00 ADULT GENERAL MEDICAL EXAM: ICD-10-CM

## 2024-03-02 DIAGNOSIS — E11.65 UNCONTROLLED TYPE 2 DIABETES MELLITUS WITH HYPERGLYCEMIA, WITHOUT LONG-TERM CURRENT USE OF INSULIN (HCC): ICD-10-CM

## 2024-03-02 DIAGNOSIS — R68.82 DECREASED LIBIDO: ICD-10-CM

## 2024-03-02 LAB
ALBUMIN SERPL-MCNC: 4.6 G/DL (ref 3.2–4.8)
ALBUMIN/GLOB SERPL: 1.6 {RATIO} (ref 1–2)
ALP LIVER SERPL-CCNC: 132 U/L
ALT SERPL-CCNC: 133 U/L
ANION GAP SERPL CALC-SCNC: 5 MMOL/L (ref 0–18)
AST SERPL-CCNC: 73 U/L (ref ?–34)
BASOPHILS # BLD AUTO: 0.01 X10(3) UL (ref 0–0.2)
BASOPHILS NFR BLD AUTO: 0.2 %
BILIRUB SERPL-MCNC: 0.6 MG/DL (ref 0.3–1.2)
BUN BLD-MCNC: 13 MG/DL (ref 9–23)
BUN/CREAT SERPL: 19.1 (ref 10–20)
CALCIUM BLD-MCNC: 9.4 MG/DL (ref 8.7–10.4)
CHLORIDE SERPL-SCNC: 106 MMOL/L (ref 98–112)
CHOLEST SERPL-MCNC: 110 MG/DL (ref ?–200)
CO2 SERPL-SCNC: 27 MMOL/L (ref 21–32)
CREAT BLD-MCNC: 0.68 MG/DL
CREAT UR-SCNC: 67.1 MG/DL
DEPRECATED RDW RBC AUTO: 41.2 FL (ref 35.1–46.3)
EGFRCR SERPLBLD CKD-EPI 2021: 120 ML/MIN/1.73M2 (ref 60–?)
EOSINOPHIL # BLD AUTO: 0.17 X10(3) UL (ref 0–0.7)
EOSINOPHIL NFR BLD AUTO: 2.9 %
ERYTHROCYTE [DISTWIDTH] IN BLOOD BY AUTOMATED COUNT: 12 % (ref 11–15)
EST. AVERAGE GLUCOSE BLD GHB EST-MCNC: 240 MG/DL (ref 68–126)
FASTING PATIENT LIPID ANSWER: YES
FASTING STATUS PATIENT QL REPORTED: YES
GLOBULIN PLAS-MCNC: 2.9 G/DL (ref 2.8–4.4)
GLUCOSE BLD-MCNC: 213 MG/DL (ref 70–99)
HBA1C MFR BLD: 10 % (ref ?–5.7)
HCT VFR BLD AUTO: 46.6 %
HDLC SERPL-MCNC: 35 MG/DL (ref 40–59)
HGB BLD-MCNC: 16.2 G/DL
IMM GRANULOCYTES # BLD AUTO: 0.02 X10(3) UL (ref 0–1)
IMM GRANULOCYTES NFR BLD: 0.3 %
LDLC SERPL CALC-MCNC: 59 MG/DL (ref ?–100)
LYMPHOCYTES # BLD AUTO: 1.88 X10(3) UL (ref 1–4)
LYMPHOCYTES NFR BLD AUTO: 31.5 %
MCH RBC QN AUTO: 32.4 PG (ref 26–34)
MCHC RBC AUTO-ENTMCNC: 34.8 G/DL (ref 31–37)
MCV RBC AUTO: 93.2 FL
MICROALBUMIN UR-MCNC: 3.8 MG/DL
MICROALBUMIN/CREAT 24H UR-RTO: 56.6 UG/MG (ref ?–30)
MONOCYTES # BLD AUTO: 0.48 X10(3) UL (ref 0.1–1)
MONOCYTES NFR BLD AUTO: 8.1 %
NEUTROPHILS # BLD AUTO: 3.4 X10 (3) UL (ref 1.5–7.7)
NEUTROPHILS # BLD AUTO: 3.4 X10(3) UL (ref 1.5–7.7)
NEUTROPHILS NFR BLD AUTO: 57 %
NONHDLC SERPL-MCNC: 75 MG/DL (ref ?–130)
OSMOLALITY SERPL CALC.SUM OF ELEC: 292 MOSM/KG (ref 275–295)
PLATELET # BLD AUTO: 140 10(3)UL (ref 150–450)
POTASSIUM SERPL-SCNC: 4.4 MMOL/L (ref 3.5–5.1)
PROT SERPL-MCNC: 7.5 G/DL (ref 5.7–8.2)
RBC # BLD AUTO: 5 X10(6)UL
SODIUM SERPL-SCNC: 138 MMOL/L (ref 136–145)
TRIGL SERPL-MCNC: 77 MG/DL (ref 30–149)
TSI SER-ACNC: 1.09 MIU/ML (ref 0.55–4.78)
VLDLC SERPL CALC-MCNC: 11 MG/DL (ref 0–30)
WBC # BLD AUTO: 6 X10(3) UL (ref 4–11)

## 2024-03-02 PROCEDURE — 84410 TESTOSTERONE BIOAVAILABLE: CPT

## 2024-03-02 PROCEDURE — 80053 COMPREHEN METABOLIC PANEL: CPT

## 2024-03-02 PROCEDURE — 36415 COLL VENOUS BLD VENIPUNCTURE: CPT

## 2024-03-02 PROCEDURE — 83036 HEMOGLOBIN GLYCOSYLATED A1C: CPT

## 2024-03-02 PROCEDURE — 85025 COMPLETE CBC W/AUTO DIFF WBC: CPT

## 2024-03-02 PROCEDURE — 3046F HEMOGLOBIN A1C LEVEL >9.0%: CPT | Performed by: FAMILY MEDICINE

## 2024-03-02 PROCEDURE — 3060F POS MICROALBUMINURIA REV: CPT | Performed by: FAMILY MEDICINE

## 2024-03-02 PROCEDURE — 80061 LIPID PANEL: CPT

## 2024-03-02 PROCEDURE — 82043 UR ALBUMIN QUANTITATIVE: CPT

## 2024-03-02 PROCEDURE — 3061F NEG MICROALBUMINURIA REV: CPT | Performed by: FAMILY MEDICINE

## 2024-03-02 PROCEDURE — 84443 ASSAY THYROID STIM HORMONE: CPT

## 2024-03-02 PROCEDURE — 82570 ASSAY OF URINE CREATININE: CPT

## 2024-03-06 LAB
SEX HORM BIND GLOB: 31.8 NMOL/L
TESTOST % FREE+WEAK BND: 29.6 %
TESTOST FREE+WEAK BND: 167.5 NG/DL
TESTOSTERONE TOT /MS: 565.8 NG/DL

## 2024-03-29 ENCOUNTER — OFFICE VISIT (OUTPATIENT)
Dept: FAMILY MEDICINE CLINIC | Facility: CLINIC | Age: 44
End: 2024-03-29

## 2024-03-29 ENCOUNTER — NURSE ONLY (OUTPATIENT)
Dept: INTERNAL MEDICINE CLINIC | Facility: CLINIC | Age: 44
End: 2024-03-29

## 2024-03-29 VITALS
BODY MASS INDEX: 35.79 KG/M2 | HEART RATE: 87 BPM | HEIGHT: 70 IN | TEMPERATURE: 97 F | WEIGHT: 250 LBS | SYSTOLIC BLOOD PRESSURE: 125 MMHG | DIASTOLIC BLOOD PRESSURE: 82 MMHG

## 2024-03-29 DIAGNOSIS — E11.65 UNCONTROLLED TYPE 2 DIABETES MELLITUS WITH HYPERGLYCEMIA, WITHOUT LONG-TERM CURRENT USE OF INSULIN (HCC): ICD-10-CM

## 2024-03-29 DIAGNOSIS — R80.9 MICROALBUMINURIA: ICD-10-CM

## 2024-03-29 DIAGNOSIS — Z00.00 ADULT GENERAL MEDICAL EXAM: Primary | ICD-10-CM

## 2024-03-29 DIAGNOSIS — R79.89 ELEVATED LIVER FUNCTION TESTS: ICD-10-CM

## 2024-03-29 DIAGNOSIS — B35.3 TINEA PEDIS OF BOTH FEET: ICD-10-CM

## 2024-03-29 DIAGNOSIS — B35.1 ONYCHOMYCOSIS OF TOENAIL: ICD-10-CM

## 2024-03-29 DIAGNOSIS — E78.2 MIXED HYPERLIPIDEMIA: ICD-10-CM

## 2024-03-29 PROCEDURE — 2033F EYE IMG VALID W/O RTNOPTHY: CPT | Performed by: FAMILY MEDICINE

## 2024-03-29 PROCEDURE — 99214 OFFICE O/P EST MOD 30 MIN: CPT | Performed by: FAMILY MEDICINE

## 2024-03-29 PROCEDURE — 92229 IMG RTA DETC/MNTR DS POC ALY: CPT | Performed by: FAMILY MEDICINE

## 2024-03-29 PROCEDURE — 3008F BODY MASS INDEX DOCD: CPT | Performed by: FAMILY MEDICINE

## 2024-03-29 PROCEDURE — 3079F DIAST BP 80-89 MM HG: CPT | Performed by: FAMILY MEDICINE

## 2024-03-29 PROCEDURE — 99396 PREV VISIT EST AGE 40-64: CPT | Performed by: FAMILY MEDICINE

## 2024-03-29 PROCEDURE — 3074F SYST BP LT 130 MM HG: CPT | Performed by: FAMILY MEDICINE

## 2024-03-29 RX ORDER — DAPAGLIFLOZIN AND METFORMIN HYDROCHLORIDE 5; 1000 MG/1; MG/1
2 TABLET, FILM COATED, EXTENDED RELEASE ORAL DAILY
Qty: 180 TABLET | Refills: 3 | Status: SHIPPED | OUTPATIENT
Start: 2024-03-29

## 2024-03-29 RX ORDER — PRENATAL VIT 91/IRON/FOLIC/DHA 28-975-200
1 COMBINATION PACKAGE (EA) ORAL 2 TIMES DAILY
Qty: 45 G | Refills: 0 | Status: SHIPPED | OUTPATIENT
Start: 2024-03-29

## 2024-03-29 RX ORDER — PIOGLITAZONEHYDROCHLORIDE 45 MG/1
TABLET ORAL
Qty: 90 TABLET | Refills: 3 | Status: SHIPPED | OUTPATIENT
Start: 2024-03-29

## 2024-03-29 RX ORDER — LISINOPRIL 2.5 MG/1
TABLET ORAL
Qty: 90 TABLET | Refills: 3 | Status: SHIPPED | OUTPATIENT
Start: 2024-03-29

## 2024-03-29 RX ORDER — ATORVASTATIN CALCIUM 20 MG/1
TABLET, FILM COATED ORAL
Qty: 90 TABLET | Refills: 3 | Status: SHIPPED | OUTPATIENT
Start: 2024-03-29

## 2024-03-29 NOTE — PATIENT INSTRUCTIONS
Start the Ozempic.  You can start 0.25 mg every week for 4 weeks and then if you are tolerating it let us know so we can send in the 0.5 mg dose which she will take from then on . do the labs around May 29, 2024.

## 2024-03-29 NOTE — PROGRESS NOTES
Patient ID: Florentino Denton is a 43 year old male.    HPI  Chief Complaint   Patient presents with    Routine Physical     Last physical on 10/08/2020.    Pt is a  and is . He smokes tobacco infrequently.     Last A1c value was 10% done 3/2/2024. Patient denies any chest pain, shortness breath, diaphoresis, dizziness, hypoglycemia, numbness or tingling in the legs. He is taking Xigduo 5-1000 mg and pioglitazone 45 mg for DM. Pt does not see an ophthalmologist. He will complete a diabetic eye exam in the office today.     I reviewed his labs from March 2024; his sugar is high and his liver function tests are elevated. He also has proteinuria. States that he was prescribed ozempic but has not used it yet; I discussed that he should take this medication.         Health Maintenance   Topic Date Due    Annual Physical  Never done    Diabetes Care Foot Exam  Never done    Diabetes Care Dilated Eye Exam  Never done    Pneumococcal Vaccine: Birth to 64yrs (2 of 2 - PCV) 10/05/2018    COVID-19 Vaccine (4 - 2023-24 season) 09/01/2023    Influenza Vaccine (1) 06/30/2024 (Originally 10/1/2023)    Diabetes Care A1C  06/02/2024    Diabetes Care: GFR  03/02/2025    Diabetes Care: Microalb/Creat Ratio  03/02/2025    DTaP,Tdap,and Td Vaccines (2 - Td or Tdap) 05/09/2027    Annual Depression Screening  Completed       =======================================================    Lab Results   Component Value Date    WBC 6.0 03/02/2024    RBC 5.00 03/02/2024    HGB 16.2 03/02/2024    HCT 46.6 03/02/2024    .0 (L) 03/02/2024    MPV 10.5 (H) 10/27/2018    MCV 93.2 03/02/2024    MCH 32.4 03/02/2024    MCHC 34.8 03/02/2024    RDW 12.0 03/02/2024    NEPRELIM 3.40 03/02/2024    NEPERCENT 57.0 03/02/2024    LYPERCENT 31.5 03/02/2024    MOPERCENT 8.1 03/02/2024    EOPERCENT 2.9 03/02/2024    BAPERCENT 0.2 03/02/2024    NE 3.40 03/02/2024    LYMABS 1.88 03/02/2024    MOABSO 0.48 03/02/2024    EOABSO 0.17 03/02/2024    BAABSO  0.01 03/02/2024       Lab Results   Component Value Date     (H) 03/02/2024    BUN 13 03/02/2024    BUNCREA 19.1 03/02/2024    CREATSERUM 0.68 (L) 03/02/2024    ANIONGAP 5 03/02/2024    GFRNAA 115 03/19/2022    GFRAA 133 03/19/2022    CA 9.4 03/02/2024    OSMOCALC 292 03/02/2024    ALKPHO 132 (H) 03/02/2024    AST 73 (H) 03/02/2024     (H) 03/02/2024    BILT 0.6 03/02/2024    TP 7.5 03/02/2024    ALB 4.6 03/02/2024    GLOBULIN 2.9 03/02/2024     03/02/2024    K 4.4 03/02/2024     03/02/2024    CO2 27.0 03/02/2024       Lab Results   Component Value Date     (H) 03/02/2024    BUN 13 03/02/2024    CREATSERUM 0.68 (L) 03/02/2024    BUNCREA 19.1 03/02/2024    ANIONGAP 5 03/02/2024    GFRAA 133 03/19/2022    GFRNAA 115 03/19/2022    CA 9.4 03/02/2024     03/02/2024    K 4.4 03/02/2024     03/02/2024    CO2 27.0 03/02/2024    OSMOCALC 292 03/02/2024       Lab Results   Component Value Date    COLORUR Yellow 10/11/2022    CLARITY Clear 10/11/2022    SPECGRAVITY >1.030 (H) 10/11/2022    GLUUR >=500 (A) 10/11/2022    BILUR Negative 10/11/2022    KETUR Negative 10/11/2022    BLOODURINE Negative 10/11/2022    PHURINE 5.0 10/11/2022    PROUR 30 (A) 10/11/2022    UROBILINOGEN 2.0 (A) 10/11/2022    NITRITE Negative 10/11/2022    LEUUR Negative 10/11/2022    NMIC Microscopic not indicated 07/29/2022    WBCUR 1-5 10/11/2022    RBCUR 0-2 10/11/2022    EPIUR Few (A) 10/11/2022    BACUR Rare (A) 10/11/2022     Lab Results   Component Value Date     (H) 03/02/2024    A1C 10.0 (H) 03/02/2024    A1C 9.0 (H) 01/28/2023    A1C 7.3 (H) 03/19/2022       Lab Results   Component Value Date    MALBP 3.80 03/02/2024    CREUR 67.10 03/02/2024       Lab Results   Component Value Date    CHOLEST 110 03/02/2024    TRIG 77 03/02/2024    HDL 35 (L) 03/02/2024    LDL 59 03/02/2024    VLDL 11 03/02/2024    NONHDLC 75 03/02/2024     TSH (mIU/mL)   Date Value   03/02/2024 1.093       Lab Results    Component Value Date    VITD 34.2 04/28/2018       =======================================================    Wt Readings from Last 6 Encounters:   03/29/24 250 lb   02/03/23 262 lb   10/11/22 262 lb   07/29/22 262 lb   04/04/22 267 lb   05/24/21 258 lb               BMI Readings from Last 6 Encounters:   03/29/24 35.87 kg/m²   02/03/23 37.59 kg/m²   10/11/22 37.59 kg/m²   07/29/22 38.69 kg/m²   04/04/22 39.43 kg/m²   05/24/21 38.10 kg/m²       BP Readings from Last 6 Encounters:   03/29/24 125/82   02/03/23 126/83   07/29/22 124/81   04/04/22 134/85   09/22/21 119/69   05/24/21 127/79         Review of Systems   Respiratory:  Negative for shortness of breath.    Cardiovascular:  Negative for chest pain.         Past Medical History:   Diagnosis Date    Anxiety     Borderline diabetes     Diabetes (HCC)     Essential hypertension     Hyperlipidemia        History reviewed. No pertinent surgical history.    Social History     Socioeconomic History    Marital status: Single     Spouse name: Not on file    Number of children: Not on file    Years of education: Not on file    Highest education level: Not on file   Occupational History    Not on file   Tobacco Use    Smoking status: Former     Types: Cigarettes    Smokeless tobacco: Never   Vaping Use    Vaping Use: Never used   Substance and Sexual Activity    Alcohol use: Yes     Alcohol/week: 0.0 standard drinks of alcohol     Comment: beer    Drug use: No    Sexual activity: Not on file   Other Topics Concern    Grew up on a farm Not Asked    History of tanning Not Asked    Outdoor occupation Not Asked    Pt has a pacemaker Not Asked    Pt has a defibrillator Not Asked    Reaction to local anesthetic Not Asked   Social History Narrative    Not on file     Social Determinants of Health     Financial Resource Strain: Not on file   Food Insecurity: Not on file   Transportation Needs: Not on file   Physical Activity: Not on file   Stress: Not on file   Social  Connections: Not on file   Housing Stability: Not on file          Current Outpatient Medications   Medication Sig Dispense Refill    lisinopril 2.5 MG Oral Tab Take 1 tablet by mouth once daily 90 tablet 3    pioglitazone 45 MG Oral Tab TAKE 1 TABLET BY MOUTH ONCE DAILY FOR DIABETES 90 tablet 3    atorvastatin 20 MG Oral Tab TAKE 1 TABLET BY MOUTH ONCE DAILY AT NIGHT FOR CHOLESTEROL 90 tablet 3    Dapagliflozin-metFORMIN HCl ER (XIGDUO XR) 5-1000 MG Oral Tablet 24 Hr Take 2 tablets by mouth daily. 180 tablet 3    Insulin Pen Needle (PEN NEEDLES) 32G X 4 MM Does not apply Misc 1 each every 7 days. 30 each 0    celecoxib 200 MG Oral Cap Take 1 capsule (200 mg total) by mouth daily with food. (Patient not taking: Reported on 3/29/2024) 90 capsule 1    semaglutide (OZEMPIC, 0.25 OR 0.5 MG/DOSE,) 2 MG/1.5ML Subcutaneous Solution Pen-injector Inject 0.25 mg into the skin every 7 days for 28 days. 4 each 0     Allergies:No Known Allergies   PHYSICAL EXAM:   Physical Exam      Physical Exam   Constitutional: He appears well-developed and well-nourished. No distress.   Head: Normocephalic.   Right Ear: Tympanic membrane and ear canal normal.   Left Ear: Tympanic membrane and ear canal normal.   Nose: No mucosal edema or rhinorrhea.  Mouth/Throat: Oropharynx is clear and moist and mucous membranes are normal.   Eyes: Conjunctivae and EOM are normal. Pupils are equal, round, and reactive to light.   Neck: Normal range of motion. Neck supple. No thyromegaly present.   Cardiovascular: Normal rate, regular rhythm and no murmur heard.   Pulmonary/Chest: Effort normal and breath sounds normal. No respiratory distress.   Abdominal: Soft. Bowel sounds are normal. There is no hepatosplenomegaly. There is no tenderness.   Lymphadenopathy: He has no cervical adenopathy.   Neurological: He is alert and oriented to person, place, and time. He has normal reflexes. No cranial nerve deficit.   Skin: Skin is warm and dry. No rash noted.    Psychiatric: He has a normal mood and affect.  Lower legs: No edema of the legs bilaterally.  Bilateral monofilament/sensation of both feet is normal. Pulsation pedal pulse exam of both lower legs/feet is normal as well. Some mild hallux valgus SHADE. He has yellowish thick great toe nails SHADE with moccasin disruption rash on the soles of his feet.       Vitals reviewed.    Blood pressure 125/82, pulse 87, temperature 97.2 °F (36.2 °C), temperature source Temporal, height 5' 10\" (1.778 m), weight 250 lb.         ASSESSMENT/PLAN:     Diagnoses and all orders for this visit:    Adult general medical exam  We reviewed his labs.  Mixed hyperlipidemia  -     atorvastatin 20 MG Oral Tab; TAKE 1 TABLET BY MOUTH ONCE DAILY AT NIGHT FOR CHOLESTEROL    Uncontrolled type 2 diabetes mellitus with hyperglycemia, without long-term current use of insulin (HCC)  -     Dapagliflozin Pro-metFORMIN ER (XIGDUO XR) 5-1000 MG Oral Tablet 24 Hr; Take 2 tablets by mouth daily.  -     pioglitazone 45 MG Oral Tab; TAKE 1 TABLET BY MOUTH ONCE DAILY FOR DIABETES  -     semaglutide 2 MG/3ML Subcutaneous Solution Pen-injector; Inject 0.25 mg into the skin once a week. Do this for 4 weeks and then afterwards we will increase the dose to the 0.5 mg/week  -     Diabetic Retinopathy Exam  OU - Both Eyes; Future  -     Hemoglobin A1C; Future  -     Basic Metabolic Panel (8); Future  Stressed to him to start the Ozempic as he is clearly not controlled with his diabetes.  Continue the other diabetic medications.  We did a diabetic retinopathy exam and the results are normal showing no retinopathy.  See patient instructions.  Microalbuminuria  -     lisinopril 2.5 MG Oral Tab; Take 1 tablet by mouth once daily    Tinea pedis of both feet  -     terbinafine (LAMISIL AT) 1 % External Cream; Apply 1 Application topically in the morning and 1 Application before bedtime. For fungus of the feet.  Do this for about 2 to 3 weeks..  Start Lamisil cream as he  seey has tinea pedis.  Onychomycosis of toenail  Mild onychomycosis.  I do not know if it is worth treating at this time as he is not bothered by this.  Elevated liver function tests  -     US LIVER (CPT=76705); Future  Talk to him about his elevated liver function test and we will get an ultrasound of the liver.      Referrals (if applicable)  No orders of the defined types were placed in this encounter.      Follow up if symptoms persist.  Take medicine (if given) as prescribed.  Approach to treatment discussed and patient/family member understands and agrees to plan.     No follow-ups on file.    Patient Instructions   Start the Ozempic.  You can start 0.25 mg every week for 4 weeks and then if you are tolerating it let us know so we can send in the 0.5 mg dose which she will take from then on . do the labs around May 29, 2024.    Ronn Frances    3/29/2024    By signing my name below, IRonn,  attest that this documentation has been prepared under the direction and in the presence of Tyshawn Riley DO.   Electronically Signed: Ronn Frances, 3/29/2024, 10:53 AM.    I, Tyshawn Riley DO,  personally performed the services described in this documentation. All medical record entries made by the scribe were at my direction and in my presence.  I have reviewed the chart and discharge instructions (if applicable) and agree that the record reflects my personal performance and is accurate and complete.  Tyshawn Riley DO, 3/30/2024, 1:01 PM

## 2024-04-28 DIAGNOSIS — E11.65 UNCONTROLLED TYPE 2 DIABETES MELLITUS WITH HYPERGLYCEMIA, WITHOUT LONG-TERM CURRENT USE OF INSULIN (HCC): ICD-10-CM

## 2024-04-30 DIAGNOSIS — E11.65 UNCONTROLLED TYPE 2 DIABETES MELLITUS WITH HYPERGLYCEMIA, WITHOUT LONG-TERM CURRENT USE OF INSULIN (HCC): ICD-10-CM

## 2024-04-30 NOTE — TELEPHONE ENCOUNTER
Note on previous order:03/29/2024   Inject 0.25 mg into the skin once a week. Do this for 4 weeks and then afterwards we will increase the dose to the 0.5 mg/week

## 2024-05-01 RX ORDER — PEN NEEDLE, DIABETIC 30 GX3/16"
1 NEEDLE, DISPOSABLE MISCELLANEOUS
Qty: 30 EACH | Refills: 3 | Status: SHIPPED | OUTPATIENT
Start: 2024-05-01

## 2024-05-01 NOTE — TELEPHONE ENCOUNTER
REFILL PASSED PER [facility] PROTOCOLS    Requested Prescriptions   Pending Prescriptions Disp Refills    Insulin Pen Needle (PEN NEEDLES) 32G X 4 MM Does not apply Misc 30 each 0     Si each every 7 days.       Diabetic Supplies Protocol Passed - 2024  5:55 AM        Passed - In person appointment or virtual visit in the past 12 mos or appointment in next 3 mos     Recent Outpatient Visits              1 month ago Uncontrolled type 2 diabetes mellitus with hyperglycemia, without long-term current use of insulin ([facility])    [facility], [place]    Nurse Only    1 month ago Adult general medical exam    [facility], [address], Tyshawn Rodrigues, DO    Office Visit    1 year ago Decreased libido    [facility], Tyshawn Rodrigues, DO    Office Visit    1 year ago Right testicular pain    [facility], Jacki Rich, APRN    Office Visit    1 year ago Pain in right testicle    [facility], Tyshawn Rodrigues, DO    Office Visit                             Recent Outpatient Visits              1 month ago Uncontrolled type 2 diabetes mellitus with hyperglycemia, without long-term current use of insulin ([facility])    [facility], [facility], [place]    Nurse Only    1 month ago Adult general medical exam    [facility], [address], Tyshawn Rodrigues, DO    Office Visit    1 year ago Decreased libido    [facility], [address], Tyshawn Rodrigues, DO    Office Visit    [facility]    Office Visit    1 year ago Pain in right testicle    [facility], Tyshawn Rodrigues, DO    Office Visit

## 2024-05-02 RX ORDER — SEMAGLUTIDE 1.34 MG/ML
1 INJECTION, SOLUTION SUBCUTANEOUS WEEKLY
Qty: 3 ML | Refills: 3 | Status: SHIPPED | OUTPATIENT
Start: 2024-05-02

## 2024-05-02 NOTE — TELEPHONE ENCOUNTER
Inform him I sent in the 1 mg dose instead as hemoglobin A1c was quite high last visit.  He will do this once weekly.  Make sure he sees me in about 2 months.

## 2024-05-06 NOTE — TELEPHONE ENCOUNTER
Left Voicemail to call back our office. Office phone number provided with office telephone hours.        See Dr Riley's message below

## 2024-05-06 NOTE — TELEPHONE ENCOUNTER
Spoke with the patient,verified full name and   Informed him of message no further questions    Will schedule appointment later

## 2024-05-29 ENCOUNTER — HOSPITAL ENCOUNTER (OUTPATIENT)
Dept: ULTRASOUND IMAGING | Age: 44
Discharge: HOME | End: 2024-05-29
Attending: FAMILY MEDICINE
Payer: COMMERCIAL

## 2024-05-29 DIAGNOSIS — R79.89 ELEVATED LIVER FUNCTION TESTS: ICD-10-CM

## 2024-05-29 PROCEDURE — 76705 ECHO EXAM OF ABDOMEN: CPT | Performed by: FAMILY MEDICINE

## 2024-06-07 ENCOUNTER — OFFICE VISIT (OUTPATIENT)
Dept: FAMILY MEDICINE CLINIC | Facility: CLINIC | Age: 44
End: 2024-06-07
Payer: COMMERCIAL

## 2024-06-07 VITALS
WEIGHT: 234 LBS | HEART RATE: 96 BPM | DIASTOLIC BLOOD PRESSURE: 76 MMHG | SYSTOLIC BLOOD PRESSURE: 111 MMHG | BODY MASS INDEX: 34 KG/M2

## 2024-06-07 DIAGNOSIS — K76.0 FATTY LIVER: ICD-10-CM

## 2024-06-07 DIAGNOSIS — J30.89 OTHER ALLERGIC RHINITIS: ICD-10-CM

## 2024-06-07 DIAGNOSIS — E11.65 UNCONTROLLED TYPE 2 DIABETES MELLITUS WITH HYPERGLYCEMIA, WITHOUT LONG-TERM CURRENT USE OF INSULIN (HCC): Primary | ICD-10-CM

## 2024-06-07 DIAGNOSIS — J02.9 ACUTE PHARYNGITIS, UNSPECIFIED ETIOLOGY: ICD-10-CM

## 2024-06-07 DIAGNOSIS — R19.7 DIARRHEA, UNSPECIFIED TYPE: ICD-10-CM

## 2024-06-07 DIAGNOSIS — R79.89 ELEVATED LIVER FUNCTION TESTS: ICD-10-CM

## 2024-06-07 DIAGNOSIS — D69.6 THROMBOCYTOPENIA (HCC): ICD-10-CM

## 2024-06-07 PROCEDURE — 3078F DIAST BP <80 MM HG: CPT | Performed by: FAMILY MEDICINE

## 2024-06-07 PROCEDURE — 99214 OFFICE O/P EST MOD 30 MIN: CPT | Performed by: FAMILY MEDICINE

## 2024-06-07 PROCEDURE — 3074F SYST BP LT 130 MM HG: CPT | Performed by: FAMILY MEDICINE

## 2024-06-07 RX ORDER — MONTELUKAST SODIUM 10 MG/1
10 TABLET ORAL NIGHTLY
Qty: 90 TABLET | Refills: 1 | Status: SHIPPED | OUTPATIENT
Start: 2024-06-07 | End: 2024-12-04

## 2024-06-07 NOTE — PROGRESS NOTES
Patient ID: Florentino Denton is a 43 year old male.    HPI  Chief Complaint   Patient presents with    Follow - Up     Liver US     Last seen by me on 3/29/2024.    He completed US Liver on 5/29/2024 for increased liver panel results in March 2024. I reviewed and discussed the results with him. I also reviewed his other lab results.  He is not having any abdominal discomfort or vomiting.    Pt would like to consult about a mild sore throat that began 1.5 weeks ago. He has post nasal drip and some mild intermittent coughing at night time. Pt hasn't tried any seasonal allergy medications. He also experiences fatigue but not every day. Pt has tried going to the gym more often as well. He is compliant with his medications.     He states pt experiences intermittent diarrhea after starting Ozempic. Pt doesn't feel too much bothered by this but wanted to discuss it with me. Denies any other side effects of the Ozempic. I discussed this with him.  He is not dehydrated.  He states that happens perhaps for the first 1 or 2 days after taking the Ozempic injection but not every single time and it is nothing unbearable.  He does not have any incontinence.      Wt Readings from Last 6 Encounters:   06/07/24 234 lb   03/29/24 250 lb   02/03/23 262 lb   10/11/22 262 lb   07/29/22 262 lb   04/04/22 267 lb       BMI Readings from Last 6 Encounters:   06/07/24 33.58 kg/m²   03/29/24 35.87 kg/m²   02/03/23 37.59 kg/m²   10/11/22 37.59 kg/m²   07/29/22 38.69 kg/m²   04/04/22 39.43 kg/m²       BP Readings from Last 6 Encounters:   06/07/24 111/76   03/29/24 125/82   02/03/23 126/83   07/29/22 124/81   04/04/22 134/85   09/22/21 119/69         Review of Systems   Constitutional:  Positive for fatigue.   HENT:  Positive for sore throat.    Respiratory:  Positive for cough. Negative for shortness of breath.    Cardiovascular:  Negative for chest pain.   Gastrointestinal:  Positive for diarrhea.           Medical History:      Past Medical  History:    Anxiety    Borderline diabetes    Diabetes (HCC)    Essential hypertension    Hyperlipidemia       History reviewed. No pertinent surgical history.       Current Outpatient Medications   Medication Sig Dispense Refill    semaglutide (OZEMPIC, 1 MG/DOSE,) 4 MG/3ML Subcutaneous Solution Pen-injector Inject 1 mg into the skin once a week. 3 mL 3    Insulin Pen Needle (PEN NEEDLES) 32G X 4 MM Does not apply Misc 1 each every 7 days. 30 each 3    atorvastatin 20 MG Oral Tab TAKE 1 TABLET BY MOUTH ONCE DAILY AT NIGHT FOR CHOLESTEROL 90 tablet 3    Dapagliflozin Pro-metFORMIN ER (XIGDUO XR) 5-1000 MG Oral Tablet 24 Hr Take 2 tablets by mouth daily. 180 tablet 3    lisinopril 2.5 MG Oral Tab Take 1 tablet by mouth once daily 90 tablet 3    pioglitazone 45 MG Oral Tab TAKE 1 TABLET BY MOUTH ONCE DAILY FOR DIABETES 90 tablet 3     Allergies:No Known Allergies     Physical Exam:       Physical Exam  Blood pressure 111/76, pulse 114, weight 234 lb.    Vitals:    06/07/24 1438 06/07/24 1449   BP: 111/76    BP Location: Right arm    Patient Position: Sitting    Cuff Size: large    Pulse: 114 96   Weight: 234 lb        Physical Exam   Constitutional: Patient is oriented to person, place, and time. Patient appears well-developed and well-nourished. No distress.   Head: Normocephalic.   Eyes: Conjunctivae and EOM are normal.   Nose: No rhinorrhea. Pale swollen turbinates.   Throat: Mild cobble stoning.  No tonsillitis or exudate.  Neck: Normal range of motion. No thyromegaly present.   Cardiovascular: Normal rate, regular rhythm and normal heart sounds.    Pulmonary/Chest: Effort normal and breath sounds normal. No respiratory distress.   Lymphadenopathy: Patient has no cervical adenopathy.  Neurological: Patient is alert and oriented to person, place, and time.   Skin: Skin is warm.   Psychiatry: Normal mood and affect.  Abdominal: Normal appearance and bowel sounds are normal. There is no tenderness. There is no  rigidity, no rebound, no guarding. No hepatosplenomegaly.     Vitals reviewed.           Assessment/Plan:      Diagnoses and all orders for this visit:    Uncontrolled type 2 diabetes mellitus with hyperglycemia, without long-term current use of insulin (HCC)  -     Hemoglobin A1C; Future  I am really proud of him on the weight that he is lost.  He states he is trying to exercise more and eat healthier.  Definitely making a difference.  He will do labs fasting in 1 week  Acute pharyngitis, unspecified etiology  -     montelukast (SINGULAIR) 10 MG Oral Tab; Take 1 tablet (10 mg total) by mouth nightly.  Lets try montelukast at night to see if that helps with the throat discomfort  Other allergic rhinitis  -     montelukast (SINGULAIR) 10 MG Oral Tab; Take 1 tablet (10 mg total) by mouth nightly.    Fatty liver  -     Comp Metabolic Panel (14); Future  Reviewed the ultrasound of the liver with him.  He is already losing weight which is wonderful  Elevated liver function tests  -     Comp Metabolic Panel (14); Future  Will recheck in 1 week  Diarrhea, unspecified type  He states it is intermittent and not terribly bothersome.  We will continue to monitor.  Thrombocytopenia (HCC)  -     CBC With Differential With Platelet; Future  No bruising or bleeding.      Referrals (if applicable)  No orders of the defined types were placed in this encounter.      Follow up if symptoms persist.  Take medicine (if given) as prescribed.  Approach to treatment discussed and patient/family member understands and agrees to plan.     No follow-ups on file.    There are no Patient Instructions on file for this visit.    Brijesh Grewal    6/7/2024    By signing my name below, IBrijesh,  attest that this documentation has been prepared under the direction and in the presence of Tyshawn Riley DO.   Electronically Signed: Brijesh Grewal, 6/7/2024, 2:38 PM.    ITyshawn DO,  personally performed the services described in this  documentation. All medical record entries made by the scribe were at my direction and in my presence.  I have reviewed the chart and discharge instructions (if applicable) and agree that the record reflects my personal performance and is accurate and complete.  Tyshawn Riley DO, 6/7/2024, 2:55 PM

## 2024-06-14 ENCOUNTER — LAB ENCOUNTER (OUTPATIENT)
Dept: LAB | Age: 44
End: 2024-06-14
Attending: FAMILY MEDICINE

## 2024-06-14 DIAGNOSIS — E11.65 UNCONTROLLED TYPE 2 DIABETES MELLITUS WITH HYPERGLYCEMIA, WITHOUT LONG-TERM CURRENT USE OF INSULIN (HCC): ICD-10-CM

## 2024-06-14 DIAGNOSIS — K76.0 FATTY LIVER: ICD-10-CM

## 2024-06-14 DIAGNOSIS — D69.6 THROMBOCYTOPENIA (HCC): ICD-10-CM

## 2024-06-14 DIAGNOSIS — R79.89 ELEVATED LIVER FUNCTION TESTS: ICD-10-CM

## 2024-06-14 LAB
ALBUMIN SERPL-MCNC: 4.4 G/DL (ref 3.2–4.8)
ALBUMIN/GLOB SERPL: 1.5 {RATIO} (ref 1–2)
ALP LIVER SERPL-CCNC: 108 U/L
ALT SERPL-CCNC: 68 U/L
ANION GAP SERPL CALC-SCNC: 6 MMOL/L (ref 0–18)
AST SERPL-CCNC: 55 U/L (ref ?–34)
BASOPHILS # BLD AUTO: 0.01 X10(3) UL (ref 0–0.2)
BASOPHILS NFR BLD AUTO: 0.2 %
BILIRUB SERPL-MCNC: 0.8 MG/DL (ref 0.3–1.2)
BUN BLD-MCNC: 13 MG/DL (ref 9–23)
BUN/CREAT SERPL: 18.3 (ref 10–20)
CALCIUM BLD-MCNC: 9.6 MG/DL (ref 8.7–10.4)
CHLORIDE SERPL-SCNC: 107 MMOL/L (ref 98–112)
CO2 SERPL-SCNC: 27 MMOL/L (ref 21–32)
CREAT BLD-MCNC: 0.71 MG/DL
DEPRECATED RDW RBC AUTO: 41.5 FL (ref 35.1–46.3)
EGFRCR SERPLBLD CKD-EPI 2021: 118 ML/MIN/1.73M2 (ref 60–?)
EOSINOPHIL # BLD AUTO: 0.16 X10(3) UL (ref 0–0.7)
EOSINOPHIL NFR BLD AUTO: 2.9 %
ERYTHROCYTE [DISTWIDTH] IN BLOOD BY AUTOMATED COUNT: 12.1 % (ref 11–15)
EST. AVERAGE GLUCOSE BLD GHB EST-MCNC: 169 MG/DL (ref 68–126)
FASTING STATUS PATIENT QL REPORTED: YES
GLOBULIN PLAS-MCNC: 3 G/DL (ref 2–3.5)
GLUCOSE BLD-MCNC: 117 MG/DL (ref 70–99)
HBA1C MFR BLD: 7.5 % (ref ?–5.7)
HCT VFR BLD AUTO: 42.9 %
HGB BLD-MCNC: 14.6 G/DL
IMM GRANULOCYTES # BLD AUTO: 0.01 X10(3) UL (ref 0–1)
IMM GRANULOCYTES NFR BLD: 0.2 %
LYMPHOCYTES # BLD AUTO: 2.13 X10(3) UL (ref 1–4)
LYMPHOCYTES NFR BLD AUTO: 38.4 %
MCH RBC QN AUTO: 31.7 PG (ref 26–34)
MCHC RBC AUTO-ENTMCNC: 34 G/DL (ref 31–37)
MCV RBC AUTO: 93.3 FL
MONOCYTES # BLD AUTO: 0.44 X10(3) UL (ref 0.1–1)
MONOCYTES NFR BLD AUTO: 7.9 %
NEUTROPHILS # BLD AUTO: 2.79 X10 (3) UL (ref 1.5–7.7)
NEUTROPHILS # BLD AUTO: 2.79 X10(3) UL (ref 1.5–7.7)
NEUTROPHILS NFR BLD AUTO: 50.4 %
OSMOLALITY SERPL CALC.SUM OF ELEC: 291 MOSM/KG (ref 275–295)
PLATELET # BLD AUTO: 144 10(3)UL (ref 150–450)
POTASSIUM SERPL-SCNC: 4 MMOL/L (ref 3.5–5.1)
PROT SERPL-MCNC: 7.4 G/DL (ref 5.7–8.2)
RBC # BLD AUTO: 4.6 X10(6)UL
SODIUM SERPL-SCNC: 140 MMOL/L (ref 136–145)
WBC # BLD AUTO: 5.5 X10(3) UL (ref 4–11)

## 2024-06-14 PROCEDURE — 36415 COLL VENOUS BLD VENIPUNCTURE: CPT

## 2024-06-14 PROCEDURE — 83036 HEMOGLOBIN GLYCOSYLATED A1C: CPT

## 2024-06-14 PROCEDURE — 85025 COMPLETE CBC W/AUTO DIFF WBC: CPT

## 2024-06-14 PROCEDURE — 80053 COMPREHEN METABOLIC PANEL: CPT

## 2024-06-16 DIAGNOSIS — E11.65 UNCONTROLLED TYPE 2 DIABETES MELLITUS WITH HYPERGLYCEMIA, WITHOUT LONG-TERM CURRENT USE OF INSULIN (HCC): Primary | ICD-10-CM

## 2024-06-23 DIAGNOSIS — E11.65 UNCONTROLLED TYPE 2 DIABETES MELLITUS WITH HYPERGLYCEMIA, WITHOUT LONG-TERM CURRENT USE OF INSULIN (HCC): Primary | ICD-10-CM

## 2024-08-02 NOTE — TELEPHONE ENCOUNTER
Please review; protocol failed/No Protocol    Pended prescription for increase of dose based on last prescription order below-please review if appropriate    Note on previous order:03/29/2024   Inject 0.25 mg into the skin once a week. Do this for 4 weeks and then afterwards we will increase the dose to the 0.5 mg/week     Requested Prescriptions   Pending Prescriptions Disp Refills    semaglutide (OZEMPIC, 0.25 OR 0.5 MG/DOSE,) 2 MG/1.5ML Subcutaneous Solution Pen-injector [Pharmacy Med Name: Ozempic (0.25 or 0.5 MG/DOSE) 2 MG/1.5ML Subcutaneous Solution Pen-injector] 1.5 mL 0     Sig: Inject 0.5 mg into the skin once a week.       Diabetes Medication Protocol Failed - 4/28/2024  1:24 PM        Failed - Last A1C < 7.5 and within past 6 months     Lab Results   Component Value Date    A1C 10.0 (H) 03/02/2024             Passed - In person appointment or virtual visit in the past 6 mos or appointment in next 3 mos     Recent Outpatient Visits              1 month ago Uncontrolled type 2 diabetes mellitus with hyperglycemia, without long-term current use of insulin ([facility])    [facility] [place], [facility], [place]    Nurse Only    1 month ago Adult general medical exam    [facility], Tyshawn Rodrigues,     Office Visit    1 year ago Decreased libido    [facility], [place], [address], Tyshawn Rodrigues,     Office Visit    [facility]    Office Visit    1 year ago Pain in right testicle    [facility], Tyshawn Rodrigues,     Office Visit                      Passed - Microalbumin procedure in past 12 months or taking ACE/ARB        Passed - EGFRCR or GFRNAA > 50     GFR Evaluation  EGFRCR: 120 , resulted on 3/2/2024          Passed - GFR in the past 12 months             Recent Outpatient Visits              1  month ago Uncontrolled type 2 diabetes mellitus with hyperglycemia, without long-term current use of insulin (HCC)    [facility], [facility], [place]    Nurse Only    1 month ago Adult general medical exam    [facility], [address], Tyshawn Rodrigues, DO    Office Visit    1 year ago Decreased libido    [facility], [address], Tyshawn Rodrigues,     Office Visit    1 year ago Right testicular pain    [facility], [facility], Jacki Rich APRN    Office Visit    1 year ago Pain in right testicle    [facility], [address], Tyshawn Rodrigues,     Office Visit               [facility] medicine, cardiology [facility] medicine

## 2024-09-06 ENCOUNTER — LAB ENCOUNTER (OUTPATIENT)
Dept: LAB | Age: 44
End: 2024-09-06
Attending: FAMILY MEDICINE
Payer: COMMERCIAL

## 2024-09-06 DIAGNOSIS — E11.65 UNCONTROLLED TYPE 2 DIABETES MELLITUS WITH HYPERGLYCEMIA, WITHOUT LONG-TERM CURRENT USE OF INSULIN (HCC): ICD-10-CM

## 2024-09-06 LAB
ALBUMIN SERPL-MCNC: 4.5 G/DL (ref 3.2–4.8)
ALBUMIN/GLOB SERPL: 1.4 {RATIO} (ref 1–2)
ALP LIVER SERPL-CCNC: 90 U/L
ALT SERPL-CCNC: 62 U/L
ANION GAP SERPL CALC-SCNC: 9 MMOL/L (ref 0–18)
AST SERPL-CCNC: 57 U/L (ref ?–34)
BILIRUB SERPL-MCNC: 1 MG/DL (ref 0.3–1.2)
BUN BLD-MCNC: 14 MG/DL (ref 9–23)
BUN/CREAT SERPL: 19.7 (ref 10–20)
CALCIUM BLD-MCNC: 9.8 MG/DL (ref 8.7–10.4)
CHLORIDE SERPL-SCNC: 105 MMOL/L (ref 98–112)
CO2 SERPL-SCNC: 23 MMOL/L (ref 21–32)
CREAT BLD-MCNC: 0.71 MG/DL
EGFRCR SERPLBLD CKD-EPI 2021: 117 ML/MIN/1.73M2 (ref 60–?)
EST. AVERAGE GLUCOSE BLD GHB EST-MCNC: 120 MG/DL (ref 68–126)
FASTING STATUS PATIENT QL REPORTED: YES
GLOBULIN PLAS-MCNC: 3.2 G/DL (ref 2–3.5)
GLUCOSE BLD-MCNC: 109 MG/DL (ref 70–99)
HBA1C MFR BLD: 5.8 % (ref ?–5.7)
OSMOLALITY SERPL CALC.SUM OF ELEC: 285 MOSM/KG (ref 275–295)
POTASSIUM SERPL-SCNC: 4.3 MMOL/L (ref 3.5–5.1)
PROT SERPL-MCNC: 7.7 G/DL (ref 5.7–8.2)
SODIUM SERPL-SCNC: 137 MMOL/L (ref 136–145)

## 2024-09-06 PROCEDURE — 36415 COLL VENOUS BLD VENIPUNCTURE: CPT

## 2024-09-06 PROCEDURE — 80053 COMPREHEN METABOLIC PANEL: CPT

## 2024-09-06 PROCEDURE — 83036 HEMOGLOBIN GLYCOSYLATED A1C: CPT

## 2024-09-13 ENCOUNTER — HOSPITAL ENCOUNTER (OUTPATIENT)
Dept: GENERAL RADIOLOGY | Age: 44
Discharge: HOME | End: 2024-09-13
Attending: FAMILY MEDICINE
Payer: COMMERCIAL

## 2024-09-13 ENCOUNTER — OFFICE VISIT (OUTPATIENT)
Dept: FAMILY MEDICINE CLINIC | Facility: CLINIC | Age: 44
End: 2024-09-13

## 2024-09-13 VITALS
HEART RATE: 88 BPM | DIASTOLIC BLOOD PRESSURE: 72 MMHG | WEIGHT: 226.63 LBS | TEMPERATURE: 97 F | HEIGHT: 70 IN | BODY MASS INDEX: 32.45 KG/M2 | SYSTOLIC BLOOD PRESSURE: 112 MMHG

## 2024-09-13 DIAGNOSIS — E11.9 CONTROLLED TYPE 2 DIABETES MELLITUS WITHOUT COMPLICATION, WITHOUT LONG-TERM CURRENT USE OF INSULIN (HCC): ICD-10-CM

## 2024-09-13 DIAGNOSIS — M79.674 PAIN OF TOE OF RIGHT FOOT: Primary | ICD-10-CM

## 2024-09-13 DIAGNOSIS — S93.501A SPRAIN OF RIGHT GREAT TOE, INITIAL ENCOUNTER: ICD-10-CM

## 2024-09-13 DIAGNOSIS — M79.674 PAIN OF TOE OF RIGHT FOOT: ICD-10-CM

## 2024-09-13 PROCEDURE — 73630 X-RAY EXAM OF FOOT: CPT | Performed by: FAMILY MEDICINE

## 2024-09-13 PROCEDURE — 3008F BODY MASS INDEX DOCD: CPT | Performed by: FAMILY MEDICINE

## 2024-09-13 PROCEDURE — 99214 OFFICE O/P EST MOD 30 MIN: CPT | Performed by: FAMILY MEDICINE

## 2024-09-13 PROCEDURE — 3051F HG A1C>EQUAL 7.0%<8.0%: CPT | Performed by: FAMILY MEDICINE

## 2024-09-13 PROCEDURE — 3074F SYST BP LT 130 MM HG: CPT | Performed by: FAMILY MEDICINE

## 2024-09-13 PROCEDURE — G2211 COMPLEX E/M VISIT ADD ON: HCPCS | Performed by: FAMILY MEDICINE

## 2024-09-13 PROCEDURE — 3078F DIAST BP <80 MM HG: CPT | Performed by: FAMILY MEDICINE

## 2024-09-13 PROCEDURE — 3044F HG A1C LEVEL LT 7.0%: CPT | Performed by: FAMILY MEDICINE

## 2024-09-13 NOTE — PROGRESS NOTES
Patient ID: Florentino Denton is a 44 year old male.    HPI  Chief Complaint   Patient presents with    Toe Pain     Right big toe pain/ swelling      I last seen him in June 2024.  He just recently had labs and hemoglobin A1c was 5.8.  Liver test were mildly high but  kidney function were normal.    He has no abdominal discomfort, chest pain.  He states he feels great.  He walks 3 miles a day about 4 times a week.  He is drinking less soda and eating healthier.  He states he gets a lot of complements and people are stating that he looks healthy which makes him happy.  He is tolerating the Ozempic without any issues now.    Patient denies any chest pain, shortness breath, diaphoresis, dizziness, hypoglycemia, numbness or tingling in the legs.    He was supposed to see me tomorrow for follow-up for the labs but he states this past Sunday he was getting out of the shower and hit his right toes on the ledge of the shower.  The toes plantarflexed and he fell backwards onto his ankle and foot.  He states that the right great toe is swollen and tender.  He has bruising over the second and third toe although those are not painful.  He states he has been walking fine and it really only hurts when he has to touch it.  He thinks he needs an x-ray.      Wt Readings from Last 6 Encounters:   09/13/24 226 lb 9.6 oz (102.8 kg)   06/07/24 234 lb (106.1 kg)   03/29/24 250 lb (113.4 kg)   02/03/23 262 lb (118.8 kg)   10/11/22 262 lb (118.8 kg)   07/29/22 262 lb (118.8 kg)       BMI Readings from Last 6 Encounters:   09/13/24 32.51 kg/m²   06/07/24 33.58 kg/m²   03/29/24 35.87 kg/m²   02/03/23 37.59 kg/m²   10/11/22 37.59 kg/m²   07/29/22 38.69 kg/m²       BP Readings from Last 6 Encounters:   06/07/24 111/76   03/29/24 125/82   02/03/23 126/83   07/29/22 124/81   04/04/22 134/85   09/22/21 119/69         Review of Systems  See HPI above      Medical History:      Past Medical History:    Anxiety    Borderline diabetes    Diabetes (HCC)     Essential hypertension    Hyperlipidemia       No past surgical history on file.       Current Outpatient Medications   Medication Sig Dispense Refill    semaglutide 8 MG/3ML Subcutaneous Solution Pen-injector Inject 2 mg into the skin once a week. 1 each 12    montelukast (SINGULAIR) 10 MG Oral Tab Take 1 tablet (10 mg total) by mouth nightly. 90 tablet 1    Insulin Pen Needle (PEN NEEDLES) 32G X 4 MM Does not apply Misc 1 each every 7 days. 30 each 3    atorvastatin 20 MG Oral Tab TAKE 1 TABLET BY MOUTH ONCE DAILY AT NIGHT FOR CHOLESTEROL 90 tablet 3    Dapagliflozin Pro-metFORMIN ER (XIGDUO XR) 5-1000 MG Oral Tablet 24 Hr Take 2 tablets by mouth daily. 180 tablet 3    lisinopril 2.5 MG Oral Tab Take 1 tablet by mouth once daily 90 tablet 3    pioglitazone 45 MG Oral Tab TAKE 1 TABLET BY MOUTH ONCE DAILY FOR DIABETES 90 tablet 3     Allergies:No Known Allergies     Physical Exam:       Physical Exam  Height 5' 10\" (1.778 m), weight 226 lb 9.6 oz (102.8 kg).  Vitals:    09/13/24 0926 09/13/24 1103   BP:  112/72   Pulse:  88   Temp: 97 °F (36.1 °C)    TempSrc: Temporal    Weight: 226 lb 9.6 oz (102.8 kg)    Height: 5' 10\" (1.778 m)      Physical Exam   Constitutional: Patient is oriented to person, place, and time. Patient appears well-developed and well-nourished. No distress.   HENT:   Head: Normocephalic and atraumatic.   Neck: Normal range of motion. Neck supple. No thyromegaly present.   Lymphadenopathy:     Has  no cervical adenopathy.   Cardiovascular: Normal rate, regular rhythm and no murmur heard.  Pulmonary/Chest: Effort normal and breath sounds normal. No respiratory distress.   Neurological: Patient is alert and oriented to person, place, and time.   Skin: Skin is warm and dry.   Psychiatric: Patient has a normal mood and affect.   No edema of the legs.  Right foot: He does have hallux valgus on the right great toe but that is not bothersome for him.  His right great toe is definitely swollen and he  has some mild tenderness over the IP joint.  No ligament laxity.  The nail is intact.  There is no bruising over the great toe.  He has quite a bit of bruising over the second and third toe but no tenderness at all.  He has no tenderness over the metatarsals.  He has 2+ pulses.  He is able to wiggle his toes.  Nursing note and vitals reviewed.           Assessment/Plan:      Diagnoses and all orders for this visit:    Pain of toe of right foot  -     XR FOOT, COMPLETE (MIN 3 VIEWS), RIGHT (CPT=73630); Future  X-ray of the foot today.  XR FOOT, COMPLETE (MIN 3 VIEWS), RIGHT (CPT=73630)    Result Date: 9/13/2024  CONCLUSION:   No acute fracture or dislocation.  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  Mild calcaneal enthesophyte.    Dictated by (CST): Enrique Tran MD on 9/13/2024 at 10:16 AM     Finalized by (CST): Enrique Tran MD on 9/13/2024 at 10:17 AM           I was able to review the x-ray and very good news as there is no fracture.  This must just be a sprain.  He will let me know if it does not continue to improve.  Sprain of right great toe, initial encounter  -     XR FOOT, COMPLETE (MIN 3 VIEWS), RIGHT (CPT=73630); Future  As above  Controlled type 2 diabetes mellitus without complication, without long-term current use of insulin (HCC)  Keep doing what you are doing.  We discussed all of his labs that were done so he most likely does not need to come in tomorrow for the follow-up that he had scheduled.      Referrals (if applicable)  No orders of the defined types were placed in this encounter.        Follow up if symptoms persist.  Take medicine (if given) as prescribed.  Approach to treatment discussed and patient/family member understands and agrees to plan.             Tyshawn Riley,   9/13/2024

## 2025-03-19 ENCOUNTER — TELEPHONE (OUTPATIENT)
Dept: FAMILY MEDICINE CLINIC | Facility: CLINIC | Age: 45
End: 2025-03-19

## 2025-03-19 DIAGNOSIS — E11.65 UNCONTROLLED TYPE 2 DIABETES MELLITUS WITH HYPERGLYCEMIA, WITHOUT LONG-TERM CURRENT USE OF INSULIN (HCC): ICD-10-CM

## 2025-03-19 DIAGNOSIS — Z00.00 ADULT GENERAL MEDICAL EXAM: Primary | ICD-10-CM

## 2025-03-19 NOTE — TELEPHONE ENCOUNTER
Patient scheduled a mychart follow up appointment for 4/4/25 with Dr. Riley and requesting orders    Can you set me up with blood work so I can do it before I go see you like I have done in the past

## 2025-03-20 ENCOUNTER — TELEPHONE (OUTPATIENT)
Dept: FAMILY MEDICINE CLINIC | Facility: CLINIC | Age: 45
End: 2025-03-20

## 2025-03-20 NOTE — TELEPHONE ENCOUNTER
The patient called back, he stated he usually is able to have labs done and then an appointment with Dr. Riley to review them. Informed him he will be due for an annual by the time his appointment is so he needs to schedule his annual, and send us a my chart requesting labs to be ordered to be done prior to his appointment. Transferred to scheduling.

## 2025-03-20 NOTE — TELEPHONE ENCOUNTER
1st attempt - left message to call back. See message below.  FYI: per Ade/CMA please notify patient to change the appointment of patient also from follow up to physical due to patient is already due for his physical.

## 2025-03-20 NOTE — TELEPHONE ENCOUNTER
4/4/25 visit cancelled and rescheduled for annual physical on 5/23/25.  Last labs completed 9/6/24.   Orders pended, please review and advise if appropriate.

## 2025-03-21 NOTE — TELEPHONE ENCOUNTER
Can you please order labs for upcoming appointment.   Airway patent, Nasal mucosa clear. Mouth with normal mucosa. Throat has no vesicles, no oropharyngeal exudates and uvula is midline.

## 2025-04-18 ENCOUNTER — LAB ENCOUNTER (OUTPATIENT)
Dept: LAB | Age: 45
End: 2025-04-18
Attending: PHYSICIAN ASSISTANT
Payer: COMMERCIAL

## 2025-04-18 ENCOUNTER — HOSPITAL ENCOUNTER (OUTPATIENT)
Dept: GENERAL RADIOLOGY | Age: 45
Discharge: HOME | End: 2025-04-18
Attending: PHYSICIAN ASSISTANT
Payer: COMMERCIAL

## 2025-04-18 ENCOUNTER — OFFICE VISIT (OUTPATIENT)
Dept: FAMILY MEDICINE CLINIC | Facility: CLINIC | Age: 45
End: 2025-04-18
Payer: COMMERCIAL

## 2025-04-18 VITALS
DIASTOLIC BLOOD PRESSURE: 78 MMHG | WEIGHT: 243 LBS | HEIGHT: 70 IN | BODY MASS INDEX: 34.79 KG/M2 | SYSTOLIC BLOOD PRESSURE: 120 MMHG | HEART RATE: 75 BPM

## 2025-04-18 DIAGNOSIS — R10.13 EPIGASTRIC PAIN: ICD-10-CM

## 2025-04-18 DIAGNOSIS — R80.9 MICROALBUMINURIA: ICD-10-CM

## 2025-04-18 DIAGNOSIS — E78.2 MIXED HYPERLIPIDEMIA: ICD-10-CM

## 2025-04-18 DIAGNOSIS — E11.65 UNCONTROLLED TYPE 2 DIABETES MELLITUS WITH HYPERGLYCEMIA, WITHOUT LONG-TERM CURRENT USE OF INSULIN (HCC): ICD-10-CM

## 2025-04-18 DIAGNOSIS — R14.0 BLOATED ABDOMEN: ICD-10-CM

## 2025-04-18 DIAGNOSIS — R10.13 EPIGASTRIC PAIN: Primary | ICD-10-CM

## 2025-04-18 LAB
BILIRUB UR QL: NEGATIVE
CLARITY UR: CLEAR
GLUCOSE UR-MCNC: NORMAL MG/DL
HGB UR QL STRIP.AUTO: NEGATIVE
KETONES UR-MCNC: NEGATIVE MG/DL
LEUKOCYTE ESTERASE UR QL STRIP.AUTO: NEGATIVE
LIPASE SERPL-CCNC: 38 U/L (ref 12–53)
NITRITE UR QL STRIP.AUTO: NEGATIVE
PH UR: 6.5 [PH] (ref 5–8)
PROT UR-MCNC: NEGATIVE MG/DL
SP GR UR STRIP: 1.02 (ref 1–1.03)
UROBILINOGEN UR STRIP-ACNC: NORMAL

## 2025-04-18 PROCEDURE — 81003 URINALYSIS AUTO W/O SCOPE: CPT

## 2025-04-18 PROCEDURE — 3074F SYST BP LT 130 MM HG: CPT | Performed by: PHYSICIAN ASSISTANT

## 2025-04-18 PROCEDURE — 83690 ASSAY OF LIPASE: CPT

## 2025-04-18 PROCEDURE — 99213 OFFICE O/P EST LOW 20 MIN: CPT | Performed by: PHYSICIAN ASSISTANT

## 2025-04-18 PROCEDURE — 74018 RADEX ABDOMEN 1 VIEW: CPT | Performed by: PHYSICIAN ASSISTANT

## 2025-04-18 PROCEDURE — 36415 COLL VENOUS BLD VENIPUNCTURE: CPT

## 2025-04-18 PROCEDURE — 3008F BODY MASS INDEX DOCD: CPT | Performed by: PHYSICIAN ASSISTANT

## 2025-04-18 PROCEDURE — 3078F DIAST BP <80 MM HG: CPT | Performed by: PHYSICIAN ASSISTANT

## 2025-04-18 RX ORDER — OMEPRAZOLE 40 MG/1
40 CAPSULE, DELAYED RELEASE ORAL
Qty: 90 CAPSULE | Refills: 1 | Status: SHIPPED | OUTPATIENT
Start: 2025-04-18 | End: 2026-04-13

## 2025-04-18 NOTE — PROGRESS NOTES
HPI:     HPI  A 44-year-old man presents with intermittent abdominal bloating for a few weeks.   He denies abdominal pain, N/V/C/D.    Medications:   Current Medications[1]    Allergies:   Allergies[2]    History:     Health Maintenance   Topic Date Due    Pneumococcal Vaccine: Birth to 50yrs (2 of 2 - PCV) 10/05/2018    COVID-19 Vaccine (4 - 2024-25 season) 09/01/2024    Annual Depression Screening  01/01/2025    Diabetes Care: Foot Exam (Annual)  01/01/2025    Diabetes Care: Microalb/Creat Ratio (Annual)  01/01/2025    Diabetes Care A1C  03/06/2025    Annual Physical  03/29/2025    Diabetes Care Dilated Eye Exam  03/29/2025    Diabetes Care: GFR  09/06/2025    Influenza Vaccine (Season Ended) 10/01/2025    DTaP,Tdap,and Td Vaccines (2 - Td or Tdap) 05/09/2027    Meningococcal B Vaccine  Aged Out       No LMP for male patient.   Past Medical History:   Past Medical History[3]    Past Surgical History:   Past Surgical History[4]    Family History:   Family History[5]    Social History:     Social History     Socioeconomic History    Marital status: Single     Spouse name: Not on file    Number of children: Not on file    Years of education: Not on file    Highest education level: Not on file   Occupational History    Not on file   Tobacco Use    Smoking status: Former     Types: Cigarettes    Smokeless tobacco: Never   Vaping Use    Vaping status: Never Used   Substance and Sexual Activity    Alcohol use: Yes     Alcohol/week: 0.0 standard drinks of alcohol     Comment: beer    Drug use: No    Sexual activity: Not on file   Other Topics Concern    Grew up on a farm Not Asked    History of tanning Not Asked    Outdoor occupation Not Asked    Pt has a pacemaker Not Asked    Pt has a defibrillator Not Asked    Reaction to local anesthetic Not Asked   Social History Narrative    Not on file     Social Drivers of Health     Food Insecurity: Not on file   Transportation Needs: Not on file   Stress: Not on file   Housing  Stability: Not on file       Review of Systems:   Review of Systems   Gastrointestinal:  Negative for abdominal distention, abdominal pain, blood in stool, constipation, diarrhea, nausea and vomiting.        Vitals:    04/18/25 0847   BP: 120/78   Pulse: 75   Weight: 243 lb (110.2 kg)   Height: 5' 10\" (1.778 m)     Body mass index is 34.87 kg/m².    Physical Exam:   Physical Exam  Vitals reviewed.   Abdominal:      General: Abdomen is flat. Bowel sounds are normal. There is no distension.      Palpations: Abdomen is soft.      Tenderness: There is abdominal tenderness in the epigastric area. There is no right CVA tenderness or left CVA tenderness.          Assessment and Plan::     Assessment & Plan  Bloated abdomen    Orders:    XR ABDOMEN (1 VIEW) (CPT=74018); Future    Epigastric pain    Orders:    Lipase [E]; Future    Urinalysis with Culture Reflex; Future    H. Pylori Stool Ag, EIA [E]; Future       Discussed plan of care with pt and pt is in agreement.All questions answered. Pt to call with questions or concerns.         [1]   Current Outpatient Medications   Medication Sig Dispense Refill    Omeprazole 40 MG Oral Capsule Delayed Release Take 1 capsule (40 mg total) by mouth daily as needed. 90 capsule 1    semaglutide 8 MG/3ML Subcutaneous Solution Pen-injector Inject 2 mg into the skin once a week. 1 each 12    atorvastatin 20 MG Oral Tab TAKE 1 TABLET BY MOUTH ONCE DAILY AT NIGHT FOR CHOLESTEROL 90 tablet 3    Dapagliflozin Pro-metFORMIN ER (XIGDUO XR) 5-1000 MG Oral Tablet 24 Hr Take 2 tablets by mouth daily. 180 tablet 3    lisinopril 2.5 MG Oral Tab Take 1 tablet by mouth once daily 90 tablet 3    pioglitazone 45 MG Oral Tab TAKE 1 TABLET BY MOUTH ONCE DAILY FOR DIABETES 90 tablet 3    Insulin Pen Needle (PEN NEEDLES) 32G X 4 MM Does not apply Misc 1 each every 7 days. 30 each 3   [2] No Known Allergies  [3]   Past Medical History:   Anxiety    Borderline diabetes    Diabetes (HCC)    Essential  hypertension    Hyperlipidemia   [4] History reviewed. No pertinent surgical history.  [5]   Family History  Problem Relation Age of Onset    Cancer Mother

## 2025-04-20 ENCOUNTER — LAB ENCOUNTER (OUTPATIENT)
Dept: LAB | Facility: HOSPITAL | Age: 45
End: 2025-04-20
Attending: PHYSICIAN ASSISTANT
Payer: COMMERCIAL

## 2025-04-20 DIAGNOSIS — R10.13 EPIGASTRIC PAIN: ICD-10-CM

## 2025-04-20 PROCEDURE — 87338 HPYLORI STOOL AG IA: CPT

## 2025-04-22 LAB — H PYLORI AG STL QL IA: NEGATIVE

## 2025-04-22 RX ORDER — LISINOPRIL 2.5 MG/1
TABLET ORAL
Qty: 90 TABLET | Refills: 3 | Status: SHIPPED | OUTPATIENT
Start: 2025-04-22

## 2025-04-22 NOTE — TELEPHONE ENCOUNTER
Please review.  Protocol failed / Has no protocol.    Asking for refill to get to appointment date:  Future Appointments   Date Time Provider Department Center   5/23/2025  1:00 PM Tyshawn Riley DO [id number]        Requested Prescriptions   Pending Prescriptions Disp Refills    atorvastatin 20 MG Oral Tab 90 tablet 3     Sig: TAKE 1 TABLET BY MOUTH ONCE DAILY AT NIGHT FOR CHOLESTEROL       Cholesterol Medication Protocol Failed - 4/22/2025  6:38 PM        Failed - Lipid panel within past 12 months        Passed - ALT < 80        Passed - ALT resulted within past year        Passed - In person appointment or virtual visit in the past 12 mos or appointment in next 3 mos        Passed - Medication is active on med list          Dapagliflozin Pro-metFORMIN ER (XIGDUO XR) 5-1000 MG Oral Tablet 24 Hr 180 tablet 3     Sig: Take 2 tablets by mouth daily.       Diabetes Medication Protocol Failed - 4/22/2025  6:38 PM        Failed - Last A1C < 7.5 and within past 6 months        Passed - In person appointment or virtual visit in the past 6 mos or appointment in next 3 mos        Passed - Microalbumin procedure in past 12 months or taking ACE/ARB        Passed - EGFRCR or GFRNAA > 50        Passed - GFR in the past 12 months        Passed - Medication is active on med list          lisinopril 2.5 MG Oral Tab 90 tablet 3     Sig: Take 1 tablet by mouth once daily       Hypertension Medications Protocol Passed - 4/22/2025  6:38 PM       pioglitazone 45 MG Oral Tab 90 tablet 3     Sig: TAKE 1 TABLET BY MOUTH ONCE DAILY FOR DIABETES       Diabetes Medication Protocol Failed - 4/22/2025  6:38 PM        Failed - Last A1C < 7.5 and within past 6 months        Passed - In person appointment or virtual visit in the past 6 mos or appointment in next 3 mos        Passed - Microalbumin procedure in past 12 months or taking ACE/ARB        Passed - EGFRCR or GFRNAA > 50        Passed - GFR in the past 12 months        Passed -  Medication is active on med list

## 2025-04-24 RX ORDER — PIOGLITAZONE 45 MG/1
TABLET ORAL
Qty: 90 TABLET | Refills: 3 | Status: SHIPPED | OUTPATIENT
Start: 2025-04-24

## 2025-04-24 RX ORDER — ATORVASTATIN CALCIUM 20 MG/1
TABLET, FILM COATED ORAL
Qty: 90 TABLET | Refills: 3 | Status: SHIPPED | OUTPATIENT
Start: 2025-04-24

## 2025-04-24 RX ORDER — DAPAGLIFLOZIN AND METFORMIN HYDROCHLORIDE 5; 1000 MG/1; MG/1
2 TABLET, FILM COATED, EXTENDED RELEASE ORAL DAILY
Qty: 180 TABLET | Refills: 3 | Status: SHIPPED | OUTPATIENT
Start: 2025-04-24

## 2025-05-14 ENCOUNTER — LAB ENCOUNTER (OUTPATIENT)
Dept: LAB | Age: 45
End: 2025-05-14
Attending: FAMILY MEDICINE
Payer: COMMERCIAL

## 2025-05-14 LAB
ALBUMIN SERPL-MCNC: 4.4 G/DL (ref 3.2–4.8)
ALBUMIN/GLOB SERPL: 1.5 {RATIO} (ref 1–2)
ALP LIVER SERPL-CCNC: 122 U/L (ref 45–117)
ALT SERPL-CCNC: 32 U/L (ref 10–49)
ANION GAP SERPL CALC-SCNC: 8 MMOL/L (ref 0–18)
AST SERPL-CCNC: 39 U/L (ref ?–34)
BASOPHILS # BLD AUTO: 0.03 X10(3) UL (ref 0–0.2)
BASOPHILS NFR BLD AUTO: 0.5 %
BILIRUB SERPL-MCNC: 1.3 MG/DL (ref 0.3–1.2)
BUN BLD-MCNC: 13 MG/DL (ref 9–23)
BUN/CREAT SERPL: 17.6 (ref 10–20)
CALCIUM BLD-MCNC: 9.4 MG/DL (ref 8.7–10.4)
CHLORIDE SERPL-SCNC: 103 MMOL/L (ref 98–112)
CHOLEST SERPL-MCNC: 129 MG/DL (ref ?–200)
CO2 SERPL-SCNC: 28 MMOL/L (ref 21–32)
CREAT BLD-MCNC: 0.74 MG/DL (ref 0.7–1.3)
CREAT UR-SCNC: 87.3 MG/DL
DEPRECATED RDW RBC AUTO: 39.8 FL (ref 35.1–46.3)
EGFRCR SERPLBLD CKD-EPI 2021: 116 ML/MIN/1.73M2 (ref 60–?)
EOSINOPHIL # BLD AUTO: 0.19 X10(3) UL (ref 0–0.7)
EOSINOPHIL NFR BLD AUTO: 3.4 %
ERYTHROCYTE [DISTWIDTH] IN BLOOD BY AUTOMATED COUNT: 12.1 % (ref 11–15)
EST. AVERAGE GLUCOSE BLD GHB EST-MCNC: 123 MG/DL (ref 68–126)
FASTING PATIENT LIPID ANSWER: YES
FASTING STATUS PATIENT QL REPORTED: YES
GLOBULIN PLAS-MCNC: 2.9 G/DL (ref 2–3.5)
GLUCOSE BLD-MCNC: 101 MG/DL (ref 70–99)
HBA1C MFR BLD: 5.9 % (ref ?–5.7)
HCT VFR BLD AUTO: 40.8 % (ref 39–53)
HDLC SERPL-MCNC: 42 MG/DL (ref 40–59)
HGB BLD-MCNC: 14.2 G/DL (ref 13–17.5)
IMM GRANULOCYTES # BLD AUTO: 0 X10(3) UL (ref 0–1)
IMM GRANULOCYTES NFR BLD: 0 %
LDLC SERPL CALC-MCNC: 66 MG/DL (ref ?–100)
LYMPHOCYTES # BLD AUTO: 1.75 X10(3) UL (ref 1–4)
LYMPHOCYTES NFR BLD AUTO: 30.9 %
MCH RBC QN AUTO: 31.3 PG (ref 26–34)
MCHC RBC AUTO-ENTMCNC: 34.8 G/DL (ref 31–37)
MCV RBC AUTO: 90.1 FL (ref 80–100)
MICROALBUMIN UR-MCNC: 0.6 MG/DL
MICROALBUMIN/CREAT 24H UR-RTO: 6.9 UG/MG (ref ?–30)
MONOCYTES # BLD AUTO: 0.51 X10(3) UL (ref 0.1–1)
MONOCYTES NFR BLD AUTO: 9 %
NEUTROPHILS # BLD AUTO: 3.18 X10 (3) UL (ref 1.5–7.7)
NEUTROPHILS # BLD AUTO: 3.18 X10(3) UL (ref 1.5–7.7)
NEUTROPHILS NFR BLD AUTO: 56.2 %
NONHDLC SERPL-MCNC: 87 MG/DL (ref ?–130)
OSMOLALITY SERPL CALC.SUM OF ELEC: 288 MOSM/KG (ref 275–295)
PLATELET # BLD AUTO: 153 10(3)UL (ref 150–450)
POTASSIUM SERPL-SCNC: 4.3 MMOL/L (ref 3.5–5.1)
PROT SERPL-MCNC: 7.3 G/DL (ref 5.7–8.2)
RBC # BLD AUTO: 4.53 X10(6)UL (ref 4.3–5.7)
SODIUM SERPL-SCNC: 139 MMOL/L (ref 136–145)
TRIGL SERPL-MCNC: 119 MG/DL (ref 30–149)
TSI SER-ACNC: 1.09 UIU/ML (ref 0.55–4.78)
VLDLC SERPL CALC-MCNC: 18 MG/DL (ref 0–30)
WBC # BLD AUTO: 5.7 X10(3) UL (ref 4–11)

## 2025-05-14 PROCEDURE — 84443 ASSAY THYROID STIM HORMONE: CPT | Performed by: FAMILY MEDICINE

## 2025-05-14 PROCEDURE — 80053 COMPREHEN METABOLIC PANEL: CPT | Performed by: FAMILY MEDICINE

## 2025-05-14 PROCEDURE — 82570 ASSAY OF URINE CREATININE: CPT | Performed by: FAMILY MEDICINE

## 2025-05-14 PROCEDURE — 82248 BILIRUBIN DIRECT: CPT | Performed by: FAMILY MEDICINE

## 2025-05-14 PROCEDURE — 83036 HEMOGLOBIN GLYCOSYLATED A1C: CPT | Performed by: FAMILY MEDICINE

## 2025-05-14 PROCEDURE — 36415 COLL VENOUS BLD VENIPUNCTURE: CPT | Performed by: FAMILY MEDICINE

## 2025-05-14 PROCEDURE — 80061 LIPID PANEL: CPT | Performed by: FAMILY MEDICINE

## 2025-05-14 PROCEDURE — 82043 UR ALBUMIN QUANTITATIVE: CPT | Performed by: FAMILY MEDICINE

## 2025-05-14 PROCEDURE — 85025 COMPLETE CBC W/AUTO DIFF WBC: CPT | Performed by: FAMILY MEDICINE

## 2025-05-23 ENCOUNTER — OFFICE VISIT (OUTPATIENT)
Dept: FAMILY MEDICINE CLINIC | Facility: CLINIC | Age: 45
End: 2025-05-23

## 2025-05-23 VITALS
BODY MASS INDEX: 34.67 KG/M2 | HEIGHT: 70 IN | HEART RATE: 91 BPM | DIASTOLIC BLOOD PRESSURE: 76 MMHG | SYSTOLIC BLOOD PRESSURE: 122 MMHG | WEIGHT: 242.19 LBS | TEMPERATURE: 97 F

## 2025-05-23 DIAGNOSIS — Z00.00 ADULT GENERAL MEDICAL EXAM: Primary | ICD-10-CM

## 2025-05-23 DIAGNOSIS — E78.2 MIXED HYPERLIPIDEMIA: ICD-10-CM

## 2025-05-23 DIAGNOSIS — K59.00 CONSTIPATION, UNSPECIFIED CONSTIPATION TYPE: ICD-10-CM

## 2025-05-23 DIAGNOSIS — Z23 NEED FOR VACCINATION: ICD-10-CM

## 2025-05-23 DIAGNOSIS — E11.9 CONTROLLED TYPE 2 DIABETES MELLITUS WITHOUT COMPLICATION, WITHOUT LONG-TERM CURRENT USE OF INSULIN (HCC): ICD-10-CM

## 2025-05-23 NOTE — PROGRESS NOTES
Patient ID: Florentino Denton is a 44 year old male.         The following individual(s) verbally consented to be recorded using ambient AI listening technology and understand that they can each withdraw their consent to this listening technology at any point by asking the clinician to turn off or pause the recording:    Patient name: Florentino Denton  Additional names:            HPI  Chief Complaint   Patient presents with    Routine Physical       History of Present Illness  Florentino Denton is a 44 year old male with diabetes who presents with concerns about bloating and medication side effects.    He has experienced bloating and abdominal discomfort, initially attributing these symptoms to his medications, specifically Xigduo and Ozempic. These symptoms persisted for one to two weeks before an abdominal x-ray on April 18 revealed constipation. He used prune juice and medication prescribed by a previous provider twice, which alleviated the bloating and discomfort. Since then, he has not experienced similar symptoms and make sure to restart the medication as he realized it was not medication related..    He has a history of diabetes and is currently on Ozempic, which he recently refilled and is taking at the highest dose, as well as Xigduo. His hemoglobin A1c is 5.9. He actively manages his condition by going to the gym and maintaining a healthy lifestyle, although he admits to some dietary indiscretions during a recent trip to [place].    He reports occasional diarrhea, which he attributes to his medications, particularly Ozempic or metformin. The diarrhea is not persistent and occurs sporadically.    He has concerns about erectile function, noting that while he can achieve erections, they are not as firm as they used to be. He is not currently taking any medication for this issue and is managing without it.  I did discuss that his body can become quite dependent on the medication and he states it is not that bad and  he still able to get a stronger erection and have intercourse.  He would like to hold off on any medication for now.    He has a history of fatty liver, which has been previously evaluated with an ultrasound. He is actively engaged in physical activity, including going to the gym and taking pre-workout supplements and protein, which he balances with adequate water intake. He has noticed improvements in his energy levels and weight management.    No numbness or tingling in his toes. He reports occasional diarrhea but no persistent gastrointestinal symptoms. He has no issues with his vision and has seen an eye doctor within the last year.    Health Maintenance   Topic Date Due    Pneumococcal Vaccine: Birth to 50yrs (2 of 2 - PCV) 10/05/2018    COVID-19 Vaccine (4 - 2024-25 season) 09/01/2024    Annual Depression Screening  01/01/2025    Annual Physical  03/29/2025    Diabetes Care Foot Exam  03/29/2025    Diabetes Care Dilated Eye Exam  03/29/2025    Influenza Vaccine (Season Ended) 10/01/2025    Diabetes Care A1C  11/14/2025    Diabetes Care: GFR  05/14/2026    DTaP,Tdap,and Td Vaccines (2 - Td or Tdap) 05/09/2027    Diabetes Care: Microalb/Creat Ratio (Annual)  Completed    Meningococcal B Vaccine  Aged Out       Results  LABS  Hemoglobin A1c: 5.9%    RADIOLOGY  Abdominal X-ray (04/18/2025): Evidence of constipation  Liver ultrasound: Hepatic steatosis    =======================================================    Lab Results   Component Value Date    WBC 5.7 05/14/2025    RBC 4.53 05/14/2025    HGB 14.2 05/14/2025    HCT 40.8 05/14/2025    .0 05/14/2025    MPV 10.5 (H) 10/27/2018    MCV 90.1 05/14/2025    MCH 31.3 05/14/2025    MCHC 34.8 05/14/2025    RDW 12.1 05/14/2025    NEPRELIM 3.18 05/14/2025    NEPERCENT 56.2 05/14/2025    LYPERCENT 30.9 05/14/2025    MOPERCENT 9.0 05/14/2025    EOPERCENT 3.4 05/14/2025    BAPERCENT 0.5 05/14/2025    NE 3.18 05/14/2025    LYMABS 1.75 05/14/2025    MOABSO 0.51  05/14/2025    EOABSO 0.19 05/14/2025    BAABSO 0.03 05/14/2025       Lab Results   Component Value Date     (H) 05/14/2025    BUN 13 05/14/2025    BUNCREA 17.6 05/14/2025    CREATSERUM 0.74 05/14/2025    ANIONGAP 8 05/14/2025    GFRNAA 115 03/19/2022    GFRAA 133 03/19/2022    CA 9.4 05/14/2025    OSMOCALC 288 05/14/2025    ALKPHO 122 (H) 05/14/2025    AST 39 (H) 05/14/2025    ALT 32 05/14/2025    BILT 1.3 (H) 05/14/2025    TP 7.3 05/14/2025    ALB 4.4 05/14/2025    GLOBULIN 2.9 05/14/2025     05/14/2025    K 4.3 05/14/2025     05/14/2025    CO2 28.0 05/14/2025       Lab Results   Component Value Date     (H) 05/14/2025    BUN 13 05/14/2025    CREATSERUM 0.74 05/14/2025    BUNCREA 17.6 05/14/2025    ANIONGAP 8 05/14/2025    GFRAA 133 03/19/2022    GFRNAA 115 03/19/2022    CA 9.4 05/14/2025     05/14/2025    K 4.3 05/14/2025     05/14/2025    CO2 28.0 05/14/2025    OSMOCALC 288 05/14/2025       Lab Results   Component Value Date    COLORUR Light-Yellow 04/18/2025    CLARITY Clear 04/18/2025    SPECGRAVITY 1.021 04/18/2025    GLUUR Normal 04/18/2025    BILUR Negative 04/18/2025    KETUR Negative 04/18/2025    BLOODURINE Negative 04/18/2025    PHURINE 6.5 04/18/2025    PROUR Negative 04/18/2025    UROBILINOGEN Normal 04/18/2025    NITRITE Negative 04/18/2025    LEUUR Negative 04/18/2025    NMIC Microscopic not indicated 04/18/2025    WBCUR 1-5 10/11/2022    RBCUR 0-2 10/11/2022    EPIUR Few (A) 10/11/2022    BACUR Rare (A) 10/11/2022     Lab Results   Component Value Date     05/14/2025    A1C 5.9 (H) 05/14/2025    A1C 5.8 (H) 09/06/2024    A1C 7.5 (H) 06/14/2024       Lab Results   Component Value Date    MALBP 0.60 05/14/2025    CREUR 87.30 05/14/2025       Lab Results   Component Value Date    CHOLEST 129 05/14/2025    TRIG 119 05/14/2025    HDL 42 05/14/2025    LDL 66 05/14/2025    VLDL 18 05/14/2025    NONHDLC 87 05/14/2025     TSH (uIU/mL)   Date Value   05/14/2025  1.095       No results found for: \"B12\", \"VITB12\"    No results found for: \"IRON\", \"IRONTOT\"    No results found for: \"SAT\"    No results found for: \"IRON\", \"IRONTOT\", \"TIBC\", \"IRONSAT\", \"TRANSFERRIN\", \"TIBCP\", \"IRONBIND\", \"SAT\", \"SATUR\"    No results found for: \"ANDRZEJ\"    Lab Results   Component Value Date    VITD 34.2 04/28/2018     No results found for: \"PSA\", \"QPSA\", \"TOTPSASCREEN\"    =======================================================    Wt Readings from Last 6 Encounters:   05/23/25 242 lb 3.2 oz (109.9 kg)   04/18/25 243 lb (110.2 kg)   09/13/24 226 lb 9.6 oz (102.8 kg)   06/07/24 234 lb (106.1 kg)   03/29/24 250 lb (113.4 kg)   02/03/23 262 lb (118.8 kg)               BMI Readings from Last 6 Encounters:   05/23/25 34.75 kg/m²   04/18/25 34.87 kg/m²   09/13/24 32.51 kg/m²   06/07/24 33.58 kg/m²   03/29/24 35.87 kg/m²   02/03/23 37.59 kg/m²       BP Readings from Last 6 Encounters:   04/18/25 120/78   09/13/24 112/72   06/07/24 111/76   03/29/24 125/82   02/03/23 126/83   07/29/22 124/81         Review of Systems  No exertional cardiac chest pain or shortness of breath unless stated in HPI which would take precedence.  See HPI for further review of systems.    Past Medical History:    Anxiety    Borderline diabetes    Diabetes (HCC)    Essential hypertension    Hyperlipidemia       No past surgical history on file.    Social History     Socioeconomic History    Marital status: Single     Spouse name: Not on file    Number of children: Not on file    Years of education: Not on file    Highest education level: Not on file   Occupational History    Not on file   Tobacco Use    Smoking status: Former     Types: Cigarettes    Smokeless tobacco: Never   Vaping Use    Vaping status: Never Used   Substance and Sexual Activity    Alcohol use: Yes     Alcohol/week: 0.0 standard drinks of alcohol     Comment: beer    Drug use: No    Sexual activity: Not on file   Other Topics Concern    Grew up on a farm Not Asked     History of tanning Not Asked    Outdoor occupation Not Asked    Pt has a pacemaker Not Asked    Pt has a defibrillator Not Asked    Reaction to local anesthetic Not Asked   Social History Narrative    Not on file     Social Drivers of Health     Food Insecurity: Not on file   Transportation Needs: Not on file   Stress: Not on file   Housing Stability: Not on file          Current Outpatient Medications   Medication Sig Dispense Refill    atorvastatin 20 MG Oral Tab TAKE 1 TABLET BY MOUTH ONCE DAILY AT NIGHT FOR CHOLESTEROL 90 tablet 3    Dapagliflozin Pro-metFORMIN ER (XIGDUO XR) 5-1000 MG Oral Tablet 24 Hr Take 2 tablets by mouth daily. 180 tablet 3    pioglitazone 45 MG Oral Tab TAKE 1 TABLET BY MOUTH ONCE DAILY FOR DIABETES 90 tablet 3    lisinopril 2.5 MG Oral Tab Take 1 tablet by mouth once daily 90 tablet 3    Omeprazole 40 MG Oral Capsule Delayed Release Take 1 capsule (40 mg total) by mouth daily as needed. 90 capsule 1    semaglutide 8 MG/3ML Subcutaneous Solution Pen-injector Inject 2 mg into the skin once a week. 1 each 12    Insulin Pen Needle (PEN NEEDLES) 32G X 4 MM Does not apply Misc 1 each every 7 days. 30 each 3     Allergies:No Known Allergies   PHYSICAL EXAM:   Physical Exam  Physical Exam   Constitutional: He appears well-developed and well-nourished. No distress.   HENT:   Head: Normocephalic.   Right Ear: Tympanic membrane and ear canal normal.   Left Ear: Tympanic membrane and ear canal normal.   Mouth/Throat: Oropharynx is clear and moist and mucous membranes are normal.   Eyes: Conjunctivae and EOM are normal. Pupils are equal, round, and reactive to light.   Neck: Normal range of motion. Neck supple. No thyromegaly present.   Cardiovascular: Normal rate, regular rhythm and no  murmur heard.  Pulmonary/Chest: Effort normal and breath sounds normal. No respiratory distress.   Abdominal: Soft. Bowel sounds are normal. There is no hepatosplenomegaly. There is no tenderness.   Lymphadenopathy:      He has no cervical adenopathy.   Neurological: He is alert and oriented to person, place, and time. He has normal reflexes. No cranial nerve deficit.   Skin: Skin is warm and dry. No rash noted.   Psychiatric: He has a normal mood and affect.   Lower legs: No edema of the legs bilaterally.  Bilateral barefoot skin diabetic exam: visualized feet and the appearance is normal.  Bilateral sensation of both feet is normal.  Pedal pulse exam of both lower legs/feet is normal as well.    Physical Exam  HEENT: Nose normal, oral cavity normal, ears normal with no cerumen.  CHEST: Lungs clear to auscultation bilaterally.  CARDIOVASCULAR: Heart sounds normal.  ABDOMEN: Abdomen normal.  Vitals reviewed.  Vitals:    05/23/25 1246   BP: 122/76   BP Location: Right arm   Patient Position: Sitting   Cuff Size: large   Pulse: 91   Temp: 97.3 °F (36.3 °C)   TempSrc: Tympanic   Weight: 242 lb 3.2 oz (109.9 kg)   Height: 5' 10\" (1.778 m)       Blood pressure 122/76, pulse 91, temperature 97.3 °F (36.3 °C), temperature source Tympanic, height 5' 10\" (1.778 m), weight 242 lb 3.2 oz (109.9 kg).               ASSESSMENT/PLAN:     Diagnoses and all orders for this visit:    Adult general medical exam  We discussed his labs.  I think he is doing very well.  He is very motivated and feels much better about himself with the weight loss.  He is exercising quite a bit as well.  Mixed hyperlipidemia  -     ALT(SGPT); Future  -     AST (SGOT); Future  Continue cholesterol medication  Controlled type 2 diabetes mellitus without complication, without long-term current use of insulin (HCC)  -     Basic Metabolic Panel (8); Future  -     Hemoglobin A1C; Future  -     OPHTHALMOLOGY - INTERNAL  Must see the eye doctor referral done  Constipation, unspecified constipation type  Resolved  Need for vaccination  -     Immunization Admin Counseling, 1st Component, 18 years and older  -     PCV20 (Prevnar 20)        Referrals (if applicable)  Orders  Placed This Encounter   Procedures    OPHTHALMOLOGY - INTERNAL     Referral Priority:   Routine     Referral Type:   OFFICE VISIT     Referred to Provider:   Martina Mckenna MD     Requested Specialty:   OPHTHALMOLOGY     Number of Visits Requested:   3         Follow up if symptoms persist.  Take medicine (if given) as prescribed.  Approach to treatment discussed and patient/family member understands and agrees to plan.     No follow-ups on file.      Assessment & Plan  Type 2 diabetes mellitus  Type 2 diabetes mellitus is well-controlled with a hemoglobin A1c of 5.9. He is on Ozempic, which is effective for both diabetes management and weight loss. He is motivated and actively managing his condition through exercise and diet. Discussed the importance of continuing Ozempic for diabetes and weight management, as weight has a significant impact on diabetes control. No current studies suggest a specific duration for Ozempic use; it should be continued as long as it benefits diabetes management.  - Continue Ozempic for diabetes management and weight control  - Schedule labs in three months to monitor diabetes control  - Refer to an eye doctor for comprehensive eye exam    Constipation  Recent episode of constipation resolved with prune juice and medication. Abdominal x-ray showed significant stool burden. Symptoms have improved after bowel movement. Omeprazole prescribed for bloating, to be used as needed.  - Use omeprazole as needed for bloating  - Monitor for recurrence of constipation and report if symptoms persist    Fatty liver  Fatty liver is well-managed with improved liver function tests. Previous ultrasound confirmed diagnosis.    Erectile dysfunction  Mild erectile dysfunction with ability to achieve sufficient erection for intercourse. Discussed potential dependency on medications for erectile dysfunction and advised against use unless symptoms worsen. Emphasized that dependency could lead to needing  medication for erections even when not necessary.    General health maintenance  Routine health maintenance discussed. Pneumonia vaccination recommended due to increased risk with diabetes.  - Administer pneumonia vaccination today  Tyshawn Riley DO  5/23/2025      .

## 2025-06-29 DIAGNOSIS — E11.65 UNCONTROLLED TYPE 2 DIABETES MELLITUS WITH HYPERGLYCEMIA, WITHOUT LONG-TERM CURRENT USE OF INSULIN (HCC): ICD-10-CM

## 2025-07-02 RX ORDER — SEMAGLUTIDE 2.68 MG/ML
2 INJECTION, SOLUTION SUBCUTANEOUS WEEKLY
Qty: 9 ML | Refills: 0 | Status: SHIPPED | OUTPATIENT
Start: 2025-07-02

## (undated) NOTE — LETTER
11/29/21    Marycruz Serrano  [address] [place] [address] [place] [id number]      Dear Radha Garcia,    Our records indicate that you have outstanding lab work and or testing that was ordered for you and has not yet been completed:  Orders Placed This Encounter      HgA1C

## (undated) NOTE — LETTER
04/29/21        Lin Common  [address] [place] [address]  [facility] [id number]      Dear [place],    Our records indicate that you have outstanding lab work and or testing that was ordered for you and has not yet been completed:  Orders Placed This Encounter      L

## (undated) NOTE — LETTER
08/24/21        Marycruz Serrano  [address] [place] [address] [place] [address]      Dear Radha Garcia,    Our records indicate that you have outstanding lab work and or testing that was ordered for you and has not yet been completed:  Orders Placed This Encounter      H

## (undated) NOTE — Clinical Note
May 24, 2017         Ival Last, DO  [address] [facility] [address] [address]      Patient: Marycruz Serrano   YOB: 1980   Date of Visit: 5/24/2017       Dear Dr. Kim Corral,    I saw your patient, Marycruz Serrano, on 5/24/2017.  En

## (undated) NOTE — LETTER
01/29/21        Rafita Ball  [address] [place] [address] [place] [address]      Dear Delia Borrego,    Our records indicate that you have outstanding lab work and or testing that was ordered for you and has not yet been completed:  Orders Placed This Encounter      L

## (undated) NOTE — LETTER
12/21/2017              [address] 73         Dear Aixa Garcia,    It has come to my attention that you missed your last appointment with me.   As you know, regular medical attention is essential to [facility]

## (undated) NOTE — ED AVS SNAPSHOT
Rhea Kar   MRN: [id number]    Department:  [facility] Emergency Department   Date of Visit:  10/23/2018           Disclosure     Insurance plans vary and the physician(s) referred by the ER may not be covered by your plan.  Please contact y CARE PHYSICIAN AT ONCE OR RETURN IMMEDIATELY TO THE EMERGENCY DEPARTMENT. If you have been prescribed any medication(s), please fill your prescription right away and begin taking the medication(s) as directed.   If you believe that any of the medications

## (undated) NOTE — LETTER
01/29/21        Gail Gage  [address] [place] [address]      Dear Daniel Lemon,    Our records indicate that you have outstanding lab work and or testing that was ordered for you and has not yet been completed:  Orders Placed This Encounter      L

## (undated) NOTE — MR AVS SNAPSHOT
[address]  [address]  [phone]               Thank you for choosing us for your health care visit with Tucker Smith DPM.  We are glad to serve you and happy to provide yo Take 1 tablet (2.5 mg total) by mouth daily. For kidney protection and/or blood pressure   Commonly known as:  PRINIVIL,ZESTRIL           MetFORMIN HCl 1000 MG Tabs   Take 1 tablet (1,000 mg total) by mouth 2 (two) times daily with meals. For diabetes.    C

## (undated) NOTE — MR AVS SNAPSHOT
[address]  [address] [facility]  [phone]               Thank you for choosing us for your health care visit with Elliot Clifford DO.   We are glad to serve you and happy to provide you with this summary 124/80 mmHg 91 98.6 °F (37 °C) (Oral) 5' 11\" (1.803 m) 248 lb (112.492 kg) 34.60 kg/m2         Current Medications          This list is accurate as of: 4/8/17 10:06 AM.  Always use your most recent med list.                cetirizine 10 MG Tabs   Take 1 Water is best for hydration Fast food. Eat at home when possible     Tips for increasing your physical activity – Adults who are physically active are less likely to develop some chronic diseases than adults who are inactive.      HOW TO GET STARTED: HOW

## (undated) NOTE — MR AVS SNAPSHOT
[address]  [address] [facility]  [phone]               Thank you for choosing us for your health care visit with Gus Sanford DO.   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   He is a       Referral Orders      Normal Orders This Visit    Tono Leesley [[id number] [address]  Order #:  [id number]         **REFERRAL REQUEST**    Your physician h numbers can create reimbursement difficulties for you. See the diabetic educator. The phone number is [phone].     Assoc Dx:  Uncontrolled type 2 diabetes mellitus with hyperglycemia, without long-term current use of insulin ([facility] [E11.65] schedule your appointment. Failure to obtain required authorization numbers can create reimbursement difficulties for you. (FOOT DOCTOR). HE IS ALSO AT THE [facility] LOCATION.     Assoc Dx:  Uncontrolled type 2 diabetes mellitus with hyperglycemia, w information, go to [email or web address] and click on the Sign Up Now link in the [employer] box. Enter your [employer] Activation Code exactly as it appears below along with your Zip Code and Date of Birth to complete the sign-up process.  If you do

## (undated) NOTE — MR AVS SNAPSHOT
[facility] SPECIALTY [facility] [place] [address] [place]  [id number] [phone]               Thank you for choosing us for your health care visit with Daily Villarreal MD.  We are glad to serve you and happy to provide you with this summary of Where to Get Your Medications      These medications were sent to [facility] [address] [facility], [address] [place] [address].  [phone], [phone] [address] [address]     Phone:  [phone]    - Montelukast Sodium 10 MG Tabs

## (undated) NOTE — LETTER
10/19/2020              Jeronimo Aburto        [address] [place] [address]         To Whom It May Concern,    My patient, Jeronimo Aburto, was seen and evaluated on 10/19/2020.   Due to his symptoms and the ongoing COVID19 pandemic, it wa